# Patient Record
Sex: FEMALE | Race: WHITE | NOT HISPANIC OR LATINO
[De-identification: names, ages, dates, MRNs, and addresses within clinical notes are randomized per-mention and may not be internally consistent; named-entity substitution may affect disease eponyms.]

---

## 2017-07-10 ENCOUNTER — APPOINTMENT (OUTPATIENT)
Dept: CARDIOLOGY | Facility: CLINIC | Age: 72
End: 2017-07-10

## 2017-07-10 ENCOUNTER — NON-APPOINTMENT (OUTPATIENT)
Age: 72
End: 2017-07-10

## 2017-07-10 ENCOUNTER — APPOINTMENT (OUTPATIENT)
Dept: INTERNAL MEDICINE | Facility: CLINIC | Age: 72
End: 2017-07-10

## 2017-07-10 VITALS
HEART RATE: 92 BPM | HEIGHT: 62 IN | OXYGEN SATURATION: 94 % | WEIGHT: 112 LBS | TEMPERATURE: 98.1 F | DIASTOLIC BLOOD PRESSURE: 76 MMHG | RESPIRATION RATE: 12 BRPM | BODY MASS INDEX: 20.61 KG/M2 | SYSTOLIC BLOOD PRESSURE: 151 MMHG

## 2017-07-10 VITALS — DIASTOLIC BLOOD PRESSURE: 60 MMHG | SYSTOLIC BLOOD PRESSURE: 136 MMHG

## 2017-07-10 DIAGNOSIS — F17.200 NICOTINE DEPENDENCE, UNSPECIFIED, UNCOMPLICATED: ICD-10-CM

## 2017-07-13 LAB
25(OH)D3 SERPL-MCNC: 10.2 NG/ML
ALBUMIN SERPL ELPH-MCNC: 3.9 G/DL
ALP BLD-CCNC: 97 U/L
ALT SERPL-CCNC: 26 U/L
ANION GAP SERPL CALC-SCNC: 18 MMOL/L
AST SERPL-CCNC: 34 U/L
BILIRUB SERPL-MCNC: 0.8 MG/DL
BUN SERPL-MCNC: 9 MG/DL
CALCIUM SERPL-MCNC: 9.9 MG/DL
CHLORIDE SERPL-SCNC: 97 MMOL/L
CHOLEST SERPL-MCNC: 219 MG/DL
CHOLEST/HDLC SERPL: 1.7 RATIO
CO2 SERPL-SCNC: 23 MMOL/L
CREAT SERPL-MCNC: 0.97 MG/DL
ETHANOL BLD-MCNC: <10
FOLATE SERPL-MCNC: <2 NG/ML
FRUCTOSAMINE SERPL-MCNC: 227 UMOL/L
GLUCOSE SERPL-MCNC: 98 MG/DL
HBA1C MFR BLD HPLC: 5.3 %
HDLC SERPL-MCNC: 128 MG/DL
IRON SATN MFR SERPL: 13 %
IRON SERPL-MCNC: 23 UG/DL
LDLC SERPL CALC-MCNC: 78 MG/DL
POTASSIUM SERPL-SCNC: 3.5 MMOL/L
PROT SERPL-MCNC: 7.2 G/DL
SODIUM SERPL-SCNC: 138 MMOL/L
TIBC SERPL-MCNC: 175 UG/DL
TRIGL SERPL-MCNC: 66 MG/DL
TSH SERPL-ACNC: 3.63 UIU/ML
UIBC SERPL-MCNC: 152 UG/DL
VIT B12 SERPL-MCNC: 379 PG/ML

## 2017-07-16 ENCOUNTER — LABORATORY RESULT (OUTPATIENT)
Age: 72
End: 2017-07-16

## 2017-07-17 ENCOUNTER — APPOINTMENT (OUTPATIENT)
Dept: CARDIOLOGY | Facility: CLINIC | Age: 72
End: 2017-07-17

## 2017-07-17 ENCOUNTER — LABORATORY RESULT (OUTPATIENT)
Age: 72
End: 2017-07-17

## 2017-07-21 LAB
APPEARANCE: ABNORMAL
BACTERIA UR CULT: ABNORMAL
BILIRUBIN URINE: NEGATIVE
BLOOD URINE: NEGATIVE
COLOR: YELLOW
GLUCOSE QUALITATIVE U: NORMAL MG/DL
KETONES URINE: NEGATIVE
LEUKOCYTE ESTERASE URINE: NEGATIVE
NITRITE URINE: POSITIVE
PH URINE: 6.5
PROTEIN URINE: NEGATIVE MG/DL
SPECIFIC GRAVITY URINE: 1.01
UROBILINOGEN URINE: NORMAL MG/DL

## 2017-07-26 ENCOUNTER — MEDICATION RENEWAL (OUTPATIENT)
Age: 72
End: 2017-07-26

## 2017-07-26 LAB
CREAT SPEC-SCNC: 65 MG/DL
MICROALBUMIN 24H UR DL<=1MG/L-MCNC: 0.4 MG/DL
MICROALBUMIN/CREAT 24H UR-RTO: 6 MG/G

## 2017-07-31 ENCOUNTER — APPOINTMENT (OUTPATIENT)
Dept: CARDIOLOGY | Facility: CLINIC | Age: 72
End: 2017-07-31
Payer: MEDICARE

## 2017-07-31 ENCOUNTER — NON-APPOINTMENT (OUTPATIENT)
Age: 72
End: 2017-07-31

## 2017-07-31 VITALS
OXYGEN SATURATION: 96 % | HEIGHT: 62 IN | HEART RATE: 78 BPM | DIASTOLIC BLOOD PRESSURE: 64 MMHG | BODY MASS INDEX: 20.98 KG/M2 | SYSTOLIC BLOOD PRESSURE: 127 MMHG | WEIGHT: 114 LBS | RESPIRATION RATE: 12 BRPM

## 2017-07-31 VITALS — SYSTOLIC BLOOD PRESSURE: 90 MMHG | DIASTOLIC BLOOD PRESSURE: 60 MMHG

## 2017-07-31 PROCEDURE — 99215 OFFICE O/P EST HI 40 MIN: CPT

## 2017-10-05 ENCOUNTER — NON-APPOINTMENT (OUTPATIENT)
Age: 72
End: 2017-10-05

## 2017-10-05 ENCOUNTER — LABORATORY RESULT (OUTPATIENT)
Age: 72
End: 2017-10-05

## 2017-10-05 ENCOUNTER — APPOINTMENT (OUTPATIENT)
Dept: INTERNAL MEDICINE | Facility: CLINIC | Age: 72
End: 2017-10-05
Payer: MEDICARE

## 2017-10-05 VITALS
SYSTOLIC BLOOD PRESSURE: 179 MMHG | BODY MASS INDEX: 20.61 KG/M2 | WEIGHT: 112 LBS | HEART RATE: 84 BPM | RESPIRATION RATE: 12 BRPM | TEMPERATURE: 98.2 F | OXYGEN SATURATION: 96 % | DIASTOLIC BLOOD PRESSURE: 91 MMHG | HEIGHT: 62 IN

## 2017-10-05 PROCEDURE — 99214 OFFICE O/P EST MOD 30 MIN: CPT

## 2017-10-05 PROCEDURE — 93000 ELECTROCARDIOGRAM COMPLETE: CPT

## 2017-10-09 ENCOUNTER — APPOINTMENT (OUTPATIENT)
Dept: PULMONOLOGY | Facility: CLINIC | Age: 72
End: 2017-10-09
Payer: MEDICARE

## 2017-10-09 ENCOUNTER — NON-APPOINTMENT (OUTPATIENT)
Age: 72
End: 2017-10-09

## 2017-10-09 ENCOUNTER — APPOINTMENT (OUTPATIENT)
Dept: CARDIOLOGY | Facility: CLINIC | Age: 72
End: 2017-10-09
Payer: MEDICARE

## 2017-10-09 VITALS
OXYGEN SATURATION: 98 % | SYSTOLIC BLOOD PRESSURE: 151 MMHG | DIASTOLIC BLOOD PRESSURE: 84 MMHG | HEART RATE: 76 BPM | TEMPERATURE: 98.2 F | RESPIRATION RATE: 12 BRPM | HEIGHT: 62 IN

## 2017-10-09 VITALS — DIASTOLIC BLOOD PRESSURE: 80 MMHG | SYSTOLIC BLOOD PRESSURE: 130 MMHG

## 2017-10-09 DIAGNOSIS — Z01.818 ENCOUNTER FOR OTHER PREPROCEDURAL EXAMINATION: ICD-10-CM

## 2017-10-09 PROCEDURE — 94060 EVALUATION OF WHEEZING: CPT

## 2017-10-09 PROCEDURE — 99214 OFFICE O/P EST MOD 30 MIN: CPT

## 2017-10-09 PROCEDURE — 99204 OFFICE O/P NEW MOD 45 MIN: CPT | Mod: 25

## 2017-10-12 ENCOUNTER — APPOINTMENT (OUTPATIENT)
Dept: INTERNAL MEDICINE | Facility: CLINIC | Age: 72
End: 2017-10-12

## 2017-10-16 LAB
ALBUMIN SERPL ELPH-MCNC: 4.2 G/DL
ALP BLD-CCNC: 64 U/L
ALT SERPL-CCNC: 8 U/L
ANION GAP SERPL CALC-SCNC: 18 MMOL/L
AST SERPL-CCNC: 17 U/L
BILIRUB SERPL-MCNC: 0.5 MG/DL
BUN SERPL-MCNC: 16 MG/DL
CALCIUM SERPL-MCNC: 10.2 MG/DL
CHLORIDE SERPL-SCNC: 99 MMOL/L
CO2 SERPL-SCNC: 23 MMOL/L
CREAT SERPL-MCNC: 0.82 MG/DL
GLUCOSE SERPL-MCNC: 104 MG/DL
INR PPP: 1 RATIO
POCT-PROTHROMBIN TIME: 12.2 SECS
POTASSIUM SERPL-SCNC: 4.5 MMOL/L
PROT SERPL-MCNC: 6.8 G/DL
SODIUM SERPL-SCNC: 140 MMOL/L
TSH SERPL-ACNC: 4.16 UIU/ML

## 2017-10-18 LAB
BASOPHILS # BLD AUTO: 0.1 K/UL
BASOPHILS NFR BLD AUTO: 1.9 %
EOSINOPHIL # BLD AUTO: 0.05 K/UL
EOSINOPHIL NFR BLD AUTO: 0.9 %
HCT VFR BLD CALC: 40.5 %
HGB BLD-MCNC: 13.1 G/DL
LYMPHOCYTES # BLD AUTO: 1.83 K/UL
LYMPHOCYTES NFR BLD AUTO: 36.1 %
MAN DIFF?: NORMAL
MCHC RBC-ENTMCNC: 32.3 GM/DL
MCHC RBC-ENTMCNC: 35.9 PG
MCV RBC AUTO: 111 FL
MONOCYTES # BLD AUTO: 0.23 K/UL
MONOCYTES NFR BLD AUTO: 4.6 %
NEUTROPHILS # BLD AUTO: 2.77 K/UL
NEUTROPHILS NFR BLD AUTO: 54.6 %
PLATELET # BLD AUTO: 192 K/UL
RBC # BLD: 3.65 M/UL
RBC # FLD: 13.9 %
WBC # FLD AUTO: 5.08 K/UL

## 2017-11-12 ENCOUNTER — RX RENEWAL (OUTPATIENT)
Age: 72
End: 2017-11-12

## 2017-12-15 ENCOUNTER — APPOINTMENT (OUTPATIENT)
Dept: INTERNAL MEDICINE | Facility: CLINIC | Age: 72
End: 2017-12-15
Payer: MEDICARE

## 2017-12-15 ENCOUNTER — MEDICATION RENEWAL (OUTPATIENT)
Age: 72
End: 2017-12-15

## 2017-12-15 VITALS
HEART RATE: 98 BPM | WEIGHT: 110 LBS | HEIGHT: 62 IN | TEMPERATURE: 97.5 F | OXYGEN SATURATION: 94 % | SYSTOLIC BLOOD PRESSURE: 200 MMHG | BODY MASS INDEX: 20.24 KG/M2 | DIASTOLIC BLOOD PRESSURE: 83 MMHG | RESPIRATION RATE: 12 BRPM

## 2017-12-15 DIAGNOSIS — F10.10 ALCOHOL ABUSE, UNCOMPLICATED: ICD-10-CM

## 2017-12-15 PROCEDURE — 99214 OFFICE O/P EST MOD 30 MIN: CPT

## 2017-12-22 ENCOUNTER — APPOINTMENT (OUTPATIENT)
Dept: INTERNAL MEDICINE | Facility: CLINIC | Age: 72
End: 2017-12-22
Payer: MEDICARE

## 2017-12-22 VITALS
DIASTOLIC BLOOD PRESSURE: 77 MMHG | WEIGHT: 110 LBS | SYSTOLIC BLOOD PRESSURE: 149 MMHG | HEART RATE: 85 BPM | BODY MASS INDEX: 20.24 KG/M2 | TEMPERATURE: 97.7 F | RESPIRATION RATE: 12 BRPM | OXYGEN SATURATION: 98 % | HEIGHT: 62 IN

## 2017-12-22 PROCEDURE — 99214 OFFICE O/P EST MOD 30 MIN: CPT

## 2018-01-24 ENCOUNTER — APPOINTMENT (OUTPATIENT)
Dept: INTERNAL MEDICINE | Facility: CLINIC | Age: 73
End: 2018-01-24

## 2018-02-19 ENCOUNTER — RX RENEWAL (OUTPATIENT)
Age: 73
End: 2018-02-19

## 2018-03-02 ENCOUNTER — APPOINTMENT (OUTPATIENT)
Dept: INTERNAL MEDICINE | Facility: CLINIC | Age: 73
End: 2018-03-02
Payer: MEDICARE

## 2018-03-02 ENCOUNTER — LABORATORY RESULT (OUTPATIENT)
Age: 73
End: 2018-03-02

## 2018-03-02 VITALS
HEIGHT: 62 IN | SYSTOLIC BLOOD PRESSURE: 145 MMHG | OXYGEN SATURATION: 98 % | HEART RATE: 91 BPM | RESPIRATION RATE: 14 BRPM | WEIGHT: 111 LBS | TEMPERATURE: 97.8 F | DIASTOLIC BLOOD PRESSURE: 78 MMHG | BODY MASS INDEX: 20.43 KG/M2

## 2018-03-02 PROCEDURE — 99214 OFFICE O/P EST MOD 30 MIN: CPT

## 2018-03-05 LAB
ALBUMIN SERPL ELPH-MCNC: 4.1 G/DL
ALP BLD-CCNC: 69 U/L
ALT SERPL-CCNC: 13 U/L
ANION GAP SERPL CALC-SCNC: 19 MMOL/L
AST SERPL-CCNC: 27 U/L
BILIRUB SERPL-MCNC: 0.3 MG/DL
BUN SERPL-MCNC: 18 MG/DL
CALCIUM SERPL-MCNC: 9.9 MG/DL
CHLORIDE SERPL-SCNC: 99 MMOL/L
CHOLEST SERPL-MCNC: 263 MG/DL
CHOLEST/HDLC SERPL: 3.7 RATIO
CO2 SERPL-SCNC: 21 MMOL/L
CREAT SERPL-MCNC: 0.89 MG/DL
GLUCOSE SERPL-MCNC: 96 MG/DL
HDLC SERPL-MCNC: 72 MG/DL
LDLC SERPL CALC-MCNC: 152 MG/DL
POTASSIUM SERPL-SCNC: 5 MMOL/L
PROT SERPL-MCNC: 7.2 G/DL
SODIUM SERPL-SCNC: 139 MMOL/L
TRIGL SERPL-MCNC: 196 MG/DL
TSH SERPL-ACNC: 2.78 UIU/ML

## 2018-03-13 LAB
BASOPHILS # BLD AUTO: 0.21 K/UL
BASOPHILS NFR BLD AUTO: 2.7 %
EOSINOPHIL # BLD AUTO: 0.28 K/UL
EOSINOPHIL NFR BLD AUTO: 3.6
HCT VFR BLD CALC: 40.9 %
HGB BLD-MCNC: 13.3 G/DL
LYMPHOCYTES # BLD AUTO: 1.41 K/UL
LYMPHOCYTES NFR BLD AUTO: 17.9 %
MAN DIFF?: NORMAL
MCHC RBC-ENTMCNC: 32.5 GM/DL
MCHC RBC-ENTMCNC: 34.8 PG
MCV RBC AUTO: 107.1 FL
MONOCYTES # BLD AUTO: 0.56 K/UL
MONOCYTES NFR BLD AUTO: 7.1 %
NEUTROPHILS # BLD AUTO: 5.41 K/UL
NEUTROPHILS NFR BLD AUTO: 68.7 %
PLATELET # BLD AUTO: 254 K/UL
RBC # BLD: 3.82 M/UL
RBC # FLD: 14.3 %
WBC # FLD AUTO: 7.87 K/UL

## 2018-03-29 ENCOUNTER — RX RENEWAL (OUTPATIENT)
Age: 73
End: 2018-03-29

## 2018-04-20 ENCOUNTER — APPOINTMENT (OUTPATIENT)
Dept: INTERNAL MEDICINE | Facility: CLINIC | Age: 73
End: 2018-04-20
Payer: MEDICARE

## 2018-04-20 VITALS
OXYGEN SATURATION: 98 % | TEMPERATURE: 97.5 F | RESPIRATION RATE: 12 BRPM | BODY MASS INDEX: 21.16 KG/M2 | HEIGHT: 62 IN | SYSTOLIC BLOOD PRESSURE: 173 MMHG | WEIGHT: 115 LBS | DIASTOLIC BLOOD PRESSURE: 73 MMHG | HEART RATE: 87 BPM

## 2018-04-20 DIAGNOSIS — I10 ESSENTIAL (PRIMARY) HYPERTENSION: ICD-10-CM

## 2018-04-20 PROCEDURE — 99214 OFFICE O/P EST MOD 30 MIN: CPT

## 2018-04-20 RX ORDER — ATENOLOL 25 MG/1
25 TABLET ORAL
Qty: 30 | Refills: 0 | Status: DISCONTINUED | COMMUNITY
Start: 2018-04-20 | End: 2018-04-20

## 2018-04-21 LAB — 25(OH)D3 SERPL-MCNC: 49.3 NG/ML

## 2018-04-24 LAB
ALBUMIN SERPL ELPH-MCNC: 3.7 G/DL
ALP BLD-CCNC: 66 U/L
ALT SERPL-CCNC: 9 U/L
ANION GAP SERPL CALC-SCNC: 14 MMOL/L
AST SERPL-CCNC: 19 U/L
BILIRUB SERPL-MCNC: 0.4 MG/DL
BUN SERPL-MCNC: 16 MG/DL
CALCIUM SERPL-MCNC: 9.6 MG/DL
CHLORIDE SERPL-SCNC: 101 MMOL/L
CO2 SERPL-SCNC: 24 MMOL/L
CREAT SERPL-MCNC: 0.84 MG/DL
GLUCOSE SERPL-MCNC: 87 MG/DL
POTASSIUM SERPL-SCNC: 4.1 MMOL/L
PROT SERPL-MCNC: 7 G/DL
SODIUM SERPL-SCNC: 139 MMOL/L
TSH SERPL-ACNC: 3.19 UIU/ML
VIT B12 SERPL-MCNC: 288 PG/ML

## 2018-06-06 ENCOUNTER — RX RENEWAL (OUTPATIENT)
Age: 73
End: 2018-06-06

## 2018-07-06 ENCOUNTER — APPOINTMENT (OUTPATIENT)
Dept: INTERNAL MEDICINE | Facility: CLINIC | Age: 73
End: 2018-07-06
Payer: MEDICARE

## 2018-07-06 VITALS
SYSTOLIC BLOOD PRESSURE: 156 MMHG | OXYGEN SATURATION: 98 % | RESPIRATION RATE: 12 BRPM | DIASTOLIC BLOOD PRESSURE: 76 MMHG | HEART RATE: 102 BPM | HEIGHT: 62 IN | TEMPERATURE: 97.9 F | WEIGHT: 110 LBS | BODY MASS INDEX: 20.24 KG/M2

## 2018-07-06 DIAGNOSIS — E53.8 DEFICIENCY OF OTHER SPECIFIED B GROUP VITAMINS: ICD-10-CM

## 2018-07-06 DIAGNOSIS — E55.9 VITAMIN D DEFICIENCY, UNSPECIFIED: ICD-10-CM

## 2018-07-06 PROCEDURE — 96372 THER/PROPH/DIAG INJ SC/IM: CPT

## 2018-07-06 PROCEDURE — 99214 OFFICE O/P EST MOD 30 MIN: CPT | Mod: 25

## 2018-07-06 RX ORDER — CYANOCOBALAMIN 1000 UG/ML
1000 INJECTION INTRAMUSCULAR; SUBCUTANEOUS
Qty: 0 | Refills: 0 | Status: COMPLETED | OUTPATIENT
Start: 2018-07-06

## 2018-07-06 RX ADMIN — CYANOCOBALAMIN 0 MCG/ML: 1000 INJECTION INTRAMUSCULAR; SUBCUTANEOUS at 00:00

## 2018-07-24 PROBLEM — E53.8 LOW VITAMIN B12 LEVEL: Status: ACTIVE | Noted: 2018-07-06

## 2018-07-24 PROBLEM — E55.9 VITAMIN D DEFICIENCY: Status: ACTIVE | Noted: 2017-07-13

## 2018-07-24 NOTE — HISTORY OF PRESENT ILLNESS
[de-identified] : 73 year old female with h/o COPD, active tobacco use 1.5 PPD/ ETOH abuse/ / Hypertension/ alcoholic liver disease/ hypercholesterolemia presents for follow up on chronic problems .   She denies CP/SOB, dizziness, N, V, abdominal pain. As per  drinks 3-4 glasses of wine daily  , smoke 2 PPD . Pt has high BP today , say forgot to take Amlodipine 10 mgh today

## 2018-07-24 NOTE — PHYSICAL EXAM
[No Acute Distress] : no acute distress [Well Nourished] : well nourished [Well Developed] : well developed [Normal Sclera/Conjunctiva] : normal sclera/conjunctiva [EOMI] : extraocular movements intact [Normal Outer Ear/Nose] : the outer ears and nose were normal in appearance [Normal Oropharynx] : the oropharynx was normal [No JVD] : no jugular venous distention [Supple] : supple [No Lymphadenopathy] : no lymphadenopathy [Thyroid Normal, No Nodules] : the thyroid was normal and there were no nodules present [No Respiratory Distress] : no respiratory distress  [Clear to Auscultation] : lungs were clear to auscultation bilaterally [No Accessory Muscle Use] : no accessory muscle use [Normal Rate] : normal rate  [Regular Rhythm] : with a regular rhythm [Normal S1, S2] : normal S1 and S2 [No Murmur] : no murmur heard [No Carotid Bruits] : no carotid bruits [No Abdominal Bruit] : a ~M bruit was not heard ~T in the abdomen [No Varicosities] : no varicosities [Pedal Pulses Present] : the pedal pulses are present [No Edema] : there was no peripheral edema [No Extremity Clubbing/Cyanosis] : no extremity clubbing/cyanosis [No Palpable Aorta] : no palpable aorta [Soft] : abdomen soft [Non Tender] : non-tender [Non-distended] : non-distended [No Masses] : no abdominal mass palpated [No HSM] : no HSM [Normal Bowel Sounds] : normal bowel sounds [Normal Posterior Cervical Nodes] : no posterior cervical lymphadenopathy [Normal Anterior Cervical Nodes] : no anterior cervical lymphadenopathy [No CVA Tenderness] : no CVA  tenderness [No Spinal Tenderness] : no spinal tenderness [No Joint Swelling] : no joint swelling [Grossly Normal Strength/Tone] : grossly normal strength/tone [No Rash] : no rash [Normal Gait] : normal gait [Coordination Grossly Intact] : coordination grossly intact [No Focal Deficits] : no focal deficits [Deep Tendon Reflexes (DTR)] : deep tendon reflexes were 2+ and symmetric [Alert and Oriented x3] : oriented to person, place, and time [de-identified] : flat affect

## 2018-07-24 NOTE — ASSESSMENT
[FreeTextEntry1] : 1. Hypertension, poor control \par low Na diet\par  Amlodipine to 10 mg QD\par alcohol / tobacco cessation d/w pt \par cardio  follow up \par 2.Hyperlipidemia\par low fat/ low cholesterol diet \par 3. COPD\par Ventolin prn \par 4. see plan

## 2018-07-24 NOTE — REVIEW OF SYSTEMS
GOMEZ DC went over dc instructions with pt and family at this time. Pt has no questions or concerns. Pt ambulated to car for dc home.
[Negative] : Heme/Lymph

## 2018-08-23 ENCOUNTER — APPOINTMENT (OUTPATIENT)
Dept: INTERNAL MEDICINE | Facility: CLINIC | Age: 73
End: 2018-08-23
Payer: MEDICARE

## 2018-08-23 VITALS
WEIGHT: 113 LBS | DIASTOLIC BLOOD PRESSURE: 75 MMHG | OXYGEN SATURATION: 97 % | HEIGHT: 62 IN | TEMPERATURE: 98.5 F | BODY MASS INDEX: 20.8 KG/M2 | SYSTOLIC BLOOD PRESSURE: 159 MMHG | HEART RATE: 88 BPM | RESPIRATION RATE: 12 BRPM

## 2018-08-23 PROCEDURE — 99214 OFFICE O/P EST MOD 30 MIN: CPT

## 2018-08-23 NOTE — PHYSICAL EXAM
[No Acute Distress] : no acute distress [Well Nourished] : well nourished [Well Developed] : well developed [Normal Sclera/Conjunctiva] : normal sclera/conjunctiva [EOMI] : extraocular movements intact [Normal Outer Ear/Nose] : the outer ears and nose were normal in appearance [Normal Oropharynx] : the oropharynx was normal [No JVD] : no jugular venous distention [Supple] : supple [No Lymphadenopathy] : no lymphadenopathy [Thyroid Normal, No Nodules] : the thyroid was normal and there were no nodules present [No Respiratory Distress] : no respiratory distress  [Clear to Auscultation] : lungs were clear to auscultation bilaterally [No Accessory Muscle Use] : no accessory muscle use [Normal Rate] : normal rate  [Regular Rhythm] : with a regular rhythm [Normal S1, S2] : normal S1 and S2 [No Murmur] : no murmur heard [No Carotid Bruits] : no carotid bruits [No Abdominal Bruit] : a ~M bruit was not heard ~T in the abdomen [No Varicosities] : no varicosities [Pedal Pulses Present] : the pedal pulses are present [No Edema] : there was no peripheral edema [No Extremity Clubbing/Cyanosis] : no extremity clubbing/cyanosis [No Palpable Aorta] : no palpable aorta [Soft] : abdomen soft [Non Tender] : non-tender [Non-distended] : non-distended [No Masses] : no abdominal mass palpated [No HSM] : no HSM [Normal Bowel Sounds] : normal bowel sounds [Normal Posterior Cervical Nodes] : no posterior cervical lymphadenopathy [Normal Anterior Cervical Nodes] : no anterior cervical lymphadenopathy [No CVA Tenderness] : no CVA  tenderness [No Spinal Tenderness] : no spinal tenderness [No Joint Swelling] : no joint swelling [Grossly Normal Strength/Tone] : grossly normal strength/tone [No Rash] : no rash [Normal Gait] : normal gait [Coordination Grossly Intact] : coordination grossly intact [No Focal Deficits] : no focal deficits [Deep Tendon Reflexes (DTR)] : deep tendon reflexes were 2+ and symmetric [Alert and Oriented x3] : oriented to person, place, and time [de-identified] : flat affect

## 2018-08-23 NOTE — HISTORY OF PRESENT ILLNESS
[de-identified] : 73 year old female with h/o COPD, active tobacco use 1.5 PPD/ ETOH abuse/ / Hypertension/ alcoholic liver disease/ hypercholesterolemia presents for follow up on chronic problems .   She denies CP/SOB, dizziness, N, V, abdominal pain. She  drinks 3-4 glasses of wine daily  , smoke 2 PPD . Pt has high BP today , reports taking  Amlodipine 10 mgh daily

## 2018-08-23 NOTE — ASSESSMENT
[FreeTextEntry1] : 1. Hypertension, poor control \par low Na diet\par  Amlodipine to 10 mg QDadd Atenolol 20 mg QD\par alcohol / tobacco cessation d/w pt \par cardio  follow up \par 2.Hyperlipidemia\par low fat/ low cholesterol diet \par 3. COPD\par Ventolin prn \par

## 2018-08-24 LAB
25(OH)D3 SERPL-MCNC: 57.4 NG/ML
ALBUMIN SERPL ELPH-MCNC: 4 G/DL
ALP BLD-CCNC: 74 U/L
ALT SERPL-CCNC: 6 U/L
ANION GAP SERPL CALC-SCNC: 15 MMOL/L
AST SERPL-CCNC: 19 U/L
BASOPHILS # BLD AUTO: 0.05 K/UL
BASOPHILS NFR BLD AUTO: 0.7 %
BILIRUB SERPL-MCNC: 0.3 MG/DL
BUN SERPL-MCNC: 14 MG/DL
CALCIUM SERPL-MCNC: 9.8 MG/DL
CHLORIDE SERPL-SCNC: 97 MMOL/L
CHOLEST SERPL-MCNC: 228 MG/DL
CHOLEST/HDLC SERPL: 3.7 RATIO
CO2 SERPL-SCNC: 25 MMOL/L
CREAT SERPL-MCNC: 0.83 MG/DL
EOSINOPHIL # BLD AUTO: 0.3 K/UL
EOSINOPHIL NFR BLD AUTO: 4.4 %
GLUCOSE SERPL-MCNC: 78 MG/DL
HCT VFR BLD CALC: 42.8 %
HDLC SERPL-MCNC: 62 MG/DL
HGB BLD-MCNC: 13.8 G/DL
IMM GRANULOCYTES NFR BLD AUTO: 0.3 %
LDLC SERPL CALC-MCNC: 125 MG/DL
LYMPHOCYTES # BLD AUTO: 1.69 K/UL
LYMPHOCYTES NFR BLD AUTO: 25 %
MAN DIFF?: NORMAL
MCHC RBC-ENTMCNC: 32.2 GM/DL
MCHC RBC-ENTMCNC: 34.9 PG
MCV RBC AUTO: 108.4 FL
MONOCYTES # BLD AUTO: 0.45 K/UL
MONOCYTES NFR BLD AUTO: 6.7 %
NEUTROPHILS # BLD AUTO: 4.25 K/UL
NEUTROPHILS NFR BLD AUTO: 62.9 %
PLATELET # BLD AUTO: 233 K/UL
POTASSIUM SERPL-SCNC: 4.2 MMOL/L
PROT SERPL-MCNC: 7.2 G/DL
RBC # BLD: 3.95 M/UL
RBC # FLD: 14.2 %
SODIUM SERPL-SCNC: 137 MMOL/L
TRIGL SERPL-MCNC: 203 MG/DL
TSH SERPL-ACNC: 3.4 UIU/ML
WBC # FLD AUTO: 6.76 K/UL

## 2018-10-18 DIAGNOSIS — Z12.11 ENCOUNTER FOR SCREENING FOR MALIGNANT NEOPLASM OF COLON: ICD-10-CM

## 2019-01-01 ENCOUNTER — APPOINTMENT (OUTPATIENT)
Dept: CT IMAGING | Facility: CLINIC | Age: 74
End: 2019-01-01

## 2019-01-01 ENCOUNTER — RX RENEWAL (OUTPATIENT)
Age: 74
End: 2019-01-01

## 2019-01-01 ENCOUNTER — APPOINTMENT (OUTPATIENT)
Dept: NEUROLOGY | Facility: CLINIC | Age: 74
End: 2019-01-01
Payer: MEDICARE

## 2019-01-01 ENCOUNTER — MESSAGE (OUTPATIENT)
Age: 74
End: 2019-01-01

## 2019-01-01 ENCOUNTER — APPOINTMENT (OUTPATIENT)
Dept: HEMATOLOGY ONCOLOGY | Facility: CLINIC | Age: 74
End: 2019-01-01
Payer: MEDICARE

## 2019-01-01 ENCOUNTER — OUTPATIENT (OUTPATIENT)
Dept: OUTPATIENT SERVICES | Facility: HOSPITAL | Age: 74
LOS: 1 days | End: 2019-01-01
Payer: MEDICARE

## 2019-01-01 ENCOUNTER — OUTPATIENT (OUTPATIENT)
Dept: OUTPATIENT SERVICES | Facility: HOSPITAL | Age: 74
LOS: 1 days | Discharge: HOME | End: 2019-01-01

## 2019-01-01 ENCOUNTER — RESULT REVIEW (OUTPATIENT)
Age: 74
End: 2019-01-01

## 2019-01-01 ENCOUNTER — LABORATORY RESULT (OUTPATIENT)
Age: 74
End: 2019-01-01

## 2019-01-01 ENCOUNTER — APPOINTMENT (OUTPATIENT)
Dept: NEUROLOGY | Facility: CLINIC | Age: 74
End: 2019-01-01

## 2019-01-01 ENCOUNTER — APPOINTMENT (OUTPATIENT)
Dept: CT IMAGING | Facility: IMAGING CENTER | Age: 74
End: 2019-01-01
Payer: MEDICARE

## 2019-01-01 ENCOUNTER — APPOINTMENT (OUTPATIENT)
Dept: CT IMAGING | Facility: HOSPITAL | Age: 74
End: 2019-01-01

## 2019-01-01 ENCOUNTER — APPOINTMENT (OUTPATIENT)
Dept: HEMATOLOGY ONCOLOGY | Facility: CLINIC | Age: 74
End: 2019-01-01

## 2019-01-01 ENCOUNTER — APPOINTMENT (OUTPATIENT)
Dept: INFUSION THERAPY | Facility: HOSPITAL | Age: 74
End: 2019-01-01

## 2019-01-01 ENCOUNTER — FORM ENCOUNTER (OUTPATIENT)
Age: 74
End: 2019-01-01

## 2019-01-01 ENCOUNTER — APPOINTMENT (OUTPATIENT)
Dept: PULMONOLOGY | Facility: CLINIC | Age: 74
End: 2019-01-01
Payer: MEDICARE

## 2019-01-01 ENCOUNTER — APPOINTMENT (OUTPATIENT)
Dept: MRI IMAGING | Facility: CLINIC | Age: 74
End: 2019-01-01

## 2019-01-01 ENCOUNTER — APPOINTMENT (OUTPATIENT)
Dept: MRI IMAGING | Facility: CLINIC | Age: 74
End: 2019-01-01
Payer: MEDICARE

## 2019-01-01 ENCOUNTER — APPOINTMENT (OUTPATIENT)
Dept: PULMONOLOGY | Facility: CLINIC | Age: 74
End: 2019-01-01

## 2019-01-01 ENCOUNTER — APPOINTMENT (OUTPATIENT)
Dept: ULTRASOUND IMAGING | Facility: IMAGING CENTER | Age: 74
End: 2019-01-01

## 2019-01-01 ENCOUNTER — APPOINTMENT (OUTPATIENT)
Dept: CT IMAGING | Facility: CLINIC | Age: 74
End: 2019-01-01
Payer: MEDICARE

## 2019-01-01 ENCOUNTER — TRANSCRIPTION ENCOUNTER (OUTPATIENT)
Age: 74
End: 2019-01-01

## 2019-01-01 ENCOUNTER — APPOINTMENT (OUTPATIENT)
Dept: RADIOLOGY | Facility: CLINIC | Age: 74
End: 2019-01-01
Payer: MEDICARE

## 2019-01-01 ENCOUNTER — APPOINTMENT (OUTPATIENT)
Dept: MRI IMAGING | Facility: IMAGING CENTER | Age: 74
End: 2019-01-01
Payer: MEDICARE

## 2019-01-01 VITALS
HEART RATE: 98 BPM | OXYGEN SATURATION: 97 % | WEIGHT: 110 LBS | RESPIRATION RATE: 16 BRPM | DIASTOLIC BLOOD PRESSURE: 61 MMHG | SYSTOLIC BLOOD PRESSURE: 103 MMHG | BODY MASS INDEX: 20.12 KG/M2 | TEMPERATURE: 99.7 F

## 2019-01-01 VITALS
OXYGEN SATURATION: 97 % | WEIGHT: 111.99 LBS | TEMPERATURE: 98.4 F | HEART RATE: 77 BPM | DIASTOLIC BLOOD PRESSURE: 60 MMHG | SYSTOLIC BLOOD PRESSURE: 101 MMHG | BODY MASS INDEX: 20.48 KG/M2 | RESPIRATION RATE: 15 BRPM

## 2019-01-01 VITALS
DIASTOLIC BLOOD PRESSURE: 76 MMHG | BODY MASS INDEX: 21.01 KG/M2 | TEMPERATURE: 97.9 F | RESPIRATION RATE: 16 BRPM | SYSTOLIC BLOOD PRESSURE: 141 MMHG | HEART RATE: 71 BPM | OXYGEN SATURATION: 96 % | WEIGHT: 114.86 LBS

## 2019-01-01 VITALS
BODY MASS INDEX: 21.33 KG/M2 | OXYGEN SATURATION: 95 % | HEART RATE: 76 BPM | RESPIRATION RATE: 20 BRPM | TEMPERATURE: 97.8 F | WEIGHT: 112.85 LBS | SYSTOLIC BLOOD PRESSURE: 108 MMHG | DIASTOLIC BLOOD PRESSURE: 61 MMHG

## 2019-01-01 VITALS
TEMPERATURE: 99.1 F | WEIGHT: 117.95 LBS | HEIGHT: 62.01 IN | OXYGEN SATURATION: 94 % | BODY MASS INDEX: 21.43 KG/M2 | DIASTOLIC BLOOD PRESSURE: 69 MMHG | HEART RATE: 82 BPM | RESPIRATION RATE: 14 BRPM | SYSTOLIC BLOOD PRESSURE: 113 MMHG

## 2019-01-01 VITALS
HEIGHT: 62 IN | HEART RATE: 94 BPM | BODY MASS INDEX: 20.43 KG/M2 | DIASTOLIC BLOOD PRESSURE: 64 MMHG | RESPIRATION RATE: 16 BRPM | SYSTOLIC BLOOD PRESSURE: 119 MMHG | WEIGHT: 111 LBS | TEMPERATURE: 98.6 F

## 2019-01-01 VITALS
BODY MASS INDEX: 21.12 KG/M2 | HEART RATE: 74 BPM | SYSTOLIC BLOOD PRESSURE: 101 MMHG | DIASTOLIC BLOOD PRESSURE: 65 MMHG | TEMPERATURE: 98.1 F | RESPIRATION RATE: 14 BRPM | WEIGHT: 115.52 LBS | OXYGEN SATURATION: 91 %

## 2019-01-01 VITALS
SYSTOLIC BLOOD PRESSURE: 114 MMHG | BODY MASS INDEX: 21.52 KG/M2 | WEIGHT: 114 LBS | DIASTOLIC BLOOD PRESSURE: 64 MMHG | HEIGHT: 61 IN | HEART RATE: 80 BPM

## 2019-01-01 VITALS
BODY MASS INDEX: 20.56 KG/M2 | HEART RATE: 71 BPM | DIASTOLIC BLOOD PRESSURE: 61 MMHG | TEMPERATURE: 99.4 F | RESPIRATION RATE: 16 BRPM | WEIGHT: 111.75 LBS | SYSTOLIC BLOOD PRESSURE: 109 MMHG | HEIGHT: 62 IN | OXYGEN SATURATION: 95 %

## 2019-01-01 VITALS
OXYGEN SATURATION: 95 % | SYSTOLIC BLOOD PRESSURE: 104 MMHG | BODY MASS INDEX: 20.4 KG/M2 | TEMPERATURE: 98.2 F | WEIGHT: 111.55 LBS | HEART RATE: 80 BPM | DIASTOLIC BLOOD PRESSURE: 66 MMHG | RESPIRATION RATE: 16 BRPM

## 2019-01-01 VITALS
RESPIRATION RATE: 15 BRPM | SYSTOLIC BLOOD PRESSURE: 114 MMHG | DIASTOLIC BLOOD PRESSURE: 64 MMHG | BODY MASS INDEX: 20.76 KG/M2 | HEART RATE: 84 BPM | WEIGHT: 113.54 LBS | OXYGEN SATURATION: 92 % | TEMPERATURE: 98 F

## 2019-01-01 DIAGNOSIS — C34.90 MALIGNANT NEOPLASM OF UNSPECIFIED PART OF UNSPECIFIED BRONCHUS OR LUNG: ICD-10-CM

## 2019-01-01 DIAGNOSIS — H91.93 UNSPECIFIED HEARING LOSS, BILATERAL: ICD-10-CM

## 2019-01-01 DIAGNOSIS — R68.89 OTHER GENERAL SYMPTOMS AND SIGNS: ICD-10-CM

## 2019-01-01 DIAGNOSIS — F41.9 ANXIETY DISORDER, UNSPECIFIED: ICD-10-CM

## 2019-01-01 DIAGNOSIS — E53.8 DEFICIENCY OF OTHER SPECIFIED B GROUP VITAMINS: ICD-10-CM

## 2019-01-01 DIAGNOSIS — R91.8 OTHER NONSPECIFIC ABNORMAL FINDING OF LUNG FIELD: ICD-10-CM

## 2019-01-01 DIAGNOSIS — Z51.11 ENCOUNTER FOR ANTINEOPLASTIC CHEMOTHERAPY: ICD-10-CM

## 2019-01-01 DIAGNOSIS — Z00.8 ENCOUNTER FOR OTHER GENERAL EXAMINATION: ICD-10-CM

## 2019-01-01 DIAGNOSIS — Z71.89 OTHER SPECIFIED COUNSELING: ICD-10-CM

## 2019-01-01 DIAGNOSIS — R26.89 OTHER ABNORMALITIES OF GAIT AND MOBILITY: ICD-10-CM

## 2019-01-01 DIAGNOSIS — J44.9 CHRONIC OBSTRUCTIVE PULMONARY DISEASE, UNSPECIFIED: ICD-10-CM

## 2019-01-01 DIAGNOSIS — R53.83 OTHER FATIGUE: ICD-10-CM

## 2019-01-01 DIAGNOSIS — R05 COUGH: ICD-10-CM

## 2019-01-01 DIAGNOSIS — R53.1 WEAKNESS: ICD-10-CM

## 2019-01-01 DIAGNOSIS — R60.0 LOCALIZED EDEMA: ICD-10-CM

## 2019-01-01 DIAGNOSIS — F02.80 DEMENTIA IN OTHER DISEASES CLASSIFIED ELSEWHERE W/OUT BEHAVIORAL DISTURBANCE: ICD-10-CM

## 2019-01-01 DIAGNOSIS — R11.2 NAUSEA WITH VOMITING, UNSPECIFIED: ICD-10-CM

## 2019-01-01 DIAGNOSIS — R63.0 ANOREXIA: ICD-10-CM

## 2019-01-01 LAB
ALBUMIN SERPL ELPH-MCNC: 3.2 G/DL
ALBUMIN SERPL ELPH-MCNC: 3.2 G/DL
ALBUMIN SERPL ELPH-MCNC: 3.4 G/DL
ALBUMIN SERPL ELPH-MCNC: 3.4 G/DL
ALBUMIN SERPL ELPH-MCNC: 3.5 G/DL
ALBUMIN SERPL ELPH-MCNC: 3.6 G/DL
ALP BLD-CCNC: 101 U/L
ALP BLD-CCNC: 105 U/L
ALP BLD-CCNC: 106 U/L
ALP BLD-CCNC: 85 U/L
ALP BLD-CCNC: 93 U/L
ALP BLD-CCNC: 95 U/L
ALT SERPL-CCNC: 10 U/L
ALT SERPL-CCNC: 10 U/L
ALT SERPL-CCNC: 13 U/L
ALT SERPL-CCNC: 13 U/L
ALT SERPL-CCNC: 21 U/L
ALT SERPL-CCNC: 6 U/L
ANION GAP SERPL CALC-SCNC: 12 MMOL/L
ANION GAP SERPL CALC-SCNC: 12 MMOL/L
ANION GAP SERPL CALC-SCNC: 13 MMOL/L
ANION GAP SERPL CALC-SCNC: 14 MMOL/L
ANION GAP SERPL CALC-SCNC: 16 MMOL/L
ANION GAP SERPL CALC-SCNC: 17 MMOL/L
AST SERPL-CCNC: 15 U/L
AST SERPL-CCNC: 15 U/L
AST SERPL-CCNC: 19 U/L
AST SERPL-CCNC: 24 U/L
AST SERPL-CCNC: 28 U/L
AST SERPL-CCNC: 33 U/L
BASOPHILS # BLD AUTO: 0 K/UL — SIGNIFICANT CHANGE UP (ref 0–0.2)
BASOPHILS # BLD AUTO: 0.1 K/UL — SIGNIFICANT CHANGE UP (ref 0–0.2)
BASOPHILS NFR BLD AUTO: 0.4 % — SIGNIFICANT CHANGE UP (ref 0–2)
BASOPHILS NFR BLD AUTO: 0.4 % — SIGNIFICANT CHANGE UP (ref 0–2)
BASOPHILS NFR BLD AUTO: 0.5 % — SIGNIFICANT CHANGE UP (ref 0–2)
BASOPHILS NFR BLD AUTO: 0.5 % — SIGNIFICANT CHANGE UP (ref 0–2)
BASOPHILS NFR BLD AUTO: 0.6 % — SIGNIFICANT CHANGE UP (ref 0–2)
BASOPHILS NFR BLD AUTO: 0.6 % — SIGNIFICANT CHANGE UP (ref 0–2)
BASOPHILS NFR BLD AUTO: 0.7 % — SIGNIFICANT CHANGE UP (ref 0–2)
BASOPHILS NFR BLD AUTO: 0.8 % — SIGNIFICANT CHANGE UP (ref 0–2)
BASOPHILS NFR BLD AUTO: 1.3 % — SIGNIFICANT CHANGE UP (ref 0–2)
BILIRUB SERPL-MCNC: 0.3 MG/DL
BILIRUB SERPL-MCNC: 0.3 MG/DL
BILIRUB SERPL-MCNC: 0.4 MG/DL
BILIRUB SERPL-MCNC: 0.6 MG/DL
BUN SERPL-MCNC: 10 MG/DL
BUN SERPL-MCNC: 10 MG/DL
BUN SERPL-MCNC: 11 MG/DL
BUN SERPL-MCNC: 6 MG/DL
BUN SERPL-MCNC: 6 MG/DL
BUN SERPL-MCNC: 8 MG/DL
CALCIUM SERPL-MCNC: 8.8 MG/DL
CALCIUM SERPL-MCNC: 8.9 MG/DL
CALCIUM SERPL-MCNC: 8.9 MG/DL
CALCIUM SERPL-MCNC: 9 MG/DL
CALCIUM SERPL-MCNC: 9.1 MG/DL
CALCIUM SERPL-MCNC: 9.4 MG/DL
CHLORIDE SERPL-SCNC: 100 MMOL/L
CHLORIDE SERPL-SCNC: 96 MMOL/L
CHLORIDE SERPL-SCNC: 97 MMOL/L
CHLORIDE SERPL-SCNC: 98 MMOL/L
CO2 SERPL-SCNC: 21 MMOL/L
CO2 SERPL-SCNC: 21 MMOL/L
CO2 SERPL-SCNC: 25 MMOL/L
CO2 SERPL-SCNC: 26 MMOL/L
CREAT SERPL-MCNC: 0.55 MG/DL
CREAT SERPL-MCNC: 0.59 MG/DL
CREAT SERPL-MCNC: 0.62 MG/DL
CREAT SERPL-MCNC: 0.64 MG/DL
CREAT SERPL-MCNC: 0.66 MG/DL
CREAT SERPL-MCNC: 0.73 MG/DL
EOSINOPHIL # BLD AUTO: 0.1 K/UL — SIGNIFICANT CHANGE UP (ref 0–0.5)
EOSINOPHIL # BLD AUTO: 0.2 K/UL — SIGNIFICANT CHANGE UP (ref 0–0.5)
EOSINOPHIL # BLD AUTO: 0.3 K/UL — SIGNIFICANT CHANGE UP (ref 0–0.5)
EOSINOPHIL # BLD AUTO: 0.3 K/UL — SIGNIFICANT CHANGE UP (ref 0–0.5)
EOSINOPHIL # BLD AUTO: 0.4 K/UL — SIGNIFICANT CHANGE UP (ref 0–0.5)
EOSINOPHIL # BLD AUTO: 0.4 K/UL — SIGNIFICANT CHANGE UP (ref 0–0.5)
EOSINOPHIL # BLD AUTO: 1.1 K/UL — HIGH (ref 0–0.5)
EOSINOPHIL NFR BLD AUTO: 0.4 % — SIGNIFICANT CHANGE UP (ref 0–6)
EOSINOPHIL NFR BLD AUTO: 0.7 % — SIGNIFICANT CHANGE UP (ref 0–6)
EOSINOPHIL NFR BLD AUTO: 1.1 % — SIGNIFICANT CHANGE UP (ref 0–6)
EOSINOPHIL NFR BLD AUTO: 1.2 % — SIGNIFICANT CHANGE UP (ref 0–6)
EOSINOPHIL NFR BLD AUTO: 14.5 % — HIGH (ref 0–6)
EOSINOPHIL NFR BLD AUTO: 3.1 % — SIGNIFICANT CHANGE UP (ref 0–6)
EOSINOPHIL NFR BLD AUTO: 3.1 % — SIGNIFICANT CHANGE UP (ref 0–6)
EOSINOPHIL NFR BLD AUTO: 4.2 % — SIGNIFICANT CHANGE UP (ref 0–6)
EOSINOPHIL NFR BLD AUTO: 4.8 % — SIGNIFICANT CHANGE UP (ref 0–6)
FOLATE SERPL-MCNC: 2.4 NG/ML
FOLATE SERPL-MCNC: >20 NG/ML
GLUCOSE SERPL-MCNC: 105 MG/DL
GLUCOSE SERPL-MCNC: 106 MG/DL
GLUCOSE SERPL-MCNC: 109 MG/DL
GLUCOSE SERPL-MCNC: 122 MG/DL
GLUCOSE SERPL-MCNC: 137 MG/DL
GLUCOSE SERPL-MCNC: 138 MG/DL
HCT VFR BLD CALC: 35.8 % — SIGNIFICANT CHANGE UP (ref 34.5–45)
HCT VFR BLD CALC: 36.1 % — SIGNIFICANT CHANGE UP (ref 34.5–45)
HCT VFR BLD CALC: 36.5 % — SIGNIFICANT CHANGE UP (ref 34.5–45)
HCT VFR BLD CALC: 37.2 % — SIGNIFICANT CHANGE UP (ref 34.5–45)
HCT VFR BLD CALC: 37.7 % — SIGNIFICANT CHANGE UP (ref 34.5–45)
HCT VFR BLD CALC: 37.7 % — SIGNIFICANT CHANGE UP (ref 34.5–45)
HCT VFR BLD CALC: 37.9 % — SIGNIFICANT CHANGE UP (ref 34.5–45)
HCT VFR BLD CALC: 37.9 % — SIGNIFICANT CHANGE UP (ref 34.5–45)
HCT VFR BLD CALC: 39.6 % — SIGNIFICANT CHANGE UP (ref 34.5–45)
HGB BLD-MCNC: 11.2 G/DL — LOW (ref 11.5–15.5)
HGB BLD-MCNC: 11.5 G/DL — SIGNIFICANT CHANGE UP (ref 11.5–15.5)
HGB BLD-MCNC: 11.9 G/DL — SIGNIFICANT CHANGE UP (ref 11.5–15.5)
HGB BLD-MCNC: 12 G/DL — SIGNIFICANT CHANGE UP (ref 11.5–15.5)
HGB BLD-MCNC: 12.3 G/DL — SIGNIFICANT CHANGE UP (ref 11.5–15.5)
HGB BLD-MCNC: 12.5 G/DL — SIGNIFICANT CHANGE UP (ref 11.5–15.5)
HGB BLD-MCNC: 12.5 G/DL — SIGNIFICANT CHANGE UP (ref 11.5–15.5)
HGB BLD-MCNC: 12.7 G/DL — SIGNIFICANT CHANGE UP (ref 11.5–15.5)
HGB BLD-MCNC: 13 G/DL — SIGNIFICANT CHANGE UP (ref 11.5–15.5)
LDH SERPL-CCNC: 179 U/L
LDH SERPL-CCNC: 181 U/L
LYMPHOCYTES # BLD AUTO: 1.1 K/UL — SIGNIFICANT CHANGE UP (ref 1–3.3)
LYMPHOCYTES # BLD AUTO: 1.1 K/UL — SIGNIFICANT CHANGE UP (ref 1–3.3)
LYMPHOCYTES # BLD AUTO: 1.3 K/UL — SIGNIFICANT CHANGE UP (ref 1–3.3)
LYMPHOCYTES # BLD AUTO: 1.4 K/UL — SIGNIFICANT CHANGE UP (ref 1–3.3)
LYMPHOCYTES # BLD AUTO: 1.6 K/UL — SIGNIFICANT CHANGE UP (ref 1–3.3)
LYMPHOCYTES # BLD AUTO: 1.6 K/UL — SIGNIFICANT CHANGE UP (ref 1–3.3)
LYMPHOCYTES # BLD AUTO: 1.8 K/UL — SIGNIFICANT CHANGE UP (ref 1–3.3)
LYMPHOCYTES # BLD AUTO: 10.5 % — LOW (ref 13–44)
LYMPHOCYTES # BLD AUTO: 11.1 % — LOW (ref 13–44)
LYMPHOCYTES # BLD AUTO: 12.3 % — LOW (ref 13–44)
LYMPHOCYTES # BLD AUTO: 13 % — SIGNIFICANT CHANGE UP (ref 13–44)
LYMPHOCYTES # BLD AUTO: 13.1 % — SIGNIFICANT CHANGE UP (ref 13–44)
LYMPHOCYTES # BLD AUTO: 13.2 % — SIGNIFICANT CHANGE UP (ref 13–44)
LYMPHOCYTES # BLD AUTO: 14.6 % — SIGNIFICANT CHANGE UP (ref 13–44)
LYMPHOCYTES # BLD AUTO: 20.1 % — SIGNIFICANT CHANGE UP (ref 13–44)
LYMPHOCYTES # BLD AUTO: 8.7 % — LOW (ref 13–44)
MCHC RBC-ENTMCNC: 31.1 G/DL — LOW (ref 32–36)
MCHC RBC-ENTMCNC: 31.2 PG — SIGNIFICANT CHANGE UP (ref 27–34)
MCHC RBC-ENTMCNC: 32 G/DL — SIGNIFICANT CHANGE UP (ref 32–36)
MCHC RBC-ENTMCNC: 32 G/DL — SIGNIFICANT CHANGE UP (ref 32–36)
MCHC RBC-ENTMCNC: 32.2 PG — SIGNIFICANT CHANGE UP (ref 27–34)
MCHC RBC-ENTMCNC: 32.4 G/DL — SIGNIFICANT CHANGE UP (ref 32–36)
MCHC RBC-ENTMCNC: 32.4 PG — SIGNIFICANT CHANGE UP (ref 27–34)
MCHC RBC-ENTMCNC: 32.5 PG — SIGNIFICANT CHANGE UP (ref 27–34)
MCHC RBC-ENTMCNC: 32.6 G/DL — SIGNIFICANT CHANGE UP (ref 32–36)
MCHC RBC-ENTMCNC: 32.7 G/DL — SIGNIFICANT CHANGE UP (ref 32–36)
MCHC RBC-ENTMCNC: 33 G/DL — SIGNIFICANT CHANGE UP (ref 32–36)
MCHC RBC-ENTMCNC: 33 PG — SIGNIFICANT CHANGE UP (ref 27–34)
MCHC RBC-ENTMCNC: 33.2 G/DL — SIGNIFICANT CHANGE UP (ref 32–36)
MCHC RBC-ENTMCNC: 33.2 PG — SIGNIFICANT CHANGE UP (ref 27–34)
MCHC RBC-ENTMCNC: 33.2 PG — SIGNIFICANT CHANGE UP (ref 27–34)
MCHC RBC-ENTMCNC: 33.5 PG — SIGNIFICANT CHANGE UP (ref 27–34)
MCHC RBC-ENTMCNC: 34.4 G/DL — SIGNIFICANT CHANGE UP (ref 32–36)
MCHC RBC-ENTMCNC: 34.7 PG — HIGH (ref 27–34)
MCV RBC AUTO: 100 FL — SIGNIFICANT CHANGE UP (ref 80–100)
MCV RBC AUTO: 101 FL — HIGH (ref 80–100)
MCV RBC AUTO: 103 FL — HIGH (ref 80–100)
MCV RBC AUTO: 98.6 FL — SIGNIFICANT CHANGE UP (ref 80–100)
MONOCYTES # BLD AUTO: 0.7 K/UL — SIGNIFICANT CHANGE UP (ref 0–0.9)
MONOCYTES # BLD AUTO: 0.8 K/UL — SIGNIFICANT CHANGE UP (ref 0–0.9)
MONOCYTES # BLD AUTO: 0.9 K/UL — SIGNIFICANT CHANGE UP (ref 0–0.9)
MONOCYTES # BLD AUTO: 1 K/UL — HIGH (ref 0–0.9)
MONOCYTES # BLD AUTO: 1 K/UL — HIGH (ref 0–0.9)
MONOCYTES # BLD AUTO: 1.2 K/UL — HIGH (ref 0–0.9)
MONOCYTES NFR BLD AUTO: 6.7 % — SIGNIFICANT CHANGE UP (ref 2–14)
MONOCYTES NFR BLD AUTO: 7.2 % — SIGNIFICANT CHANGE UP (ref 2–14)
MONOCYTES NFR BLD AUTO: 7.4 % — SIGNIFICANT CHANGE UP (ref 2–14)
MONOCYTES NFR BLD AUTO: 7.6 % — SIGNIFICANT CHANGE UP (ref 2–14)
MONOCYTES NFR BLD AUTO: 7.8 % — SIGNIFICANT CHANGE UP (ref 2–14)
MONOCYTES NFR BLD AUTO: 7.8 % — SIGNIFICANT CHANGE UP (ref 2–14)
MONOCYTES NFR BLD AUTO: 8.2 % — SIGNIFICANT CHANGE UP (ref 2–14)
MONOCYTES NFR BLD AUTO: 8.4 % — SIGNIFICANT CHANGE UP (ref 2–14)
MONOCYTES NFR BLD AUTO: 9.6 % — SIGNIFICANT CHANGE UP (ref 2–14)
NEUTROPHILS # BLD AUTO: 10.3 K/UL — HIGH (ref 1.8–7.4)
NEUTROPHILS # BLD AUTO: 10.4 K/UL — HIGH (ref 1.8–7.4)
NEUTROPHILS # BLD AUTO: 13.2 K/UL — HIGH (ref 1.8–7.4)
NEUTROPHILS # BLD AUTO: 4.2 K/UL — SIGNIFICANT CHANGE UP (ref 1.8–7.4)
NEUTROPHILS # BLD AUTO: 6.4 K/UL — SIGNIFICANT CHANGE UP (ref 1.8–7.4)
NEUTROPHILS # BLD AUTO: 6.9 K/UL — SIGNIFICANT CHANGE UP (ref 1.8–7.4)
NEUTROPHILS # BLD AUTO: 7.6 K/UL — HIGH (ref 1.8–7.4)
NEUTROPHILS # BLD AUTO: 8 K/UL — HIGH (ref 1.8–7.4)
NEUTROPHILS # BLD AUTO: 9.7 K/UL — HIGH (ref 1.8–7.4)
NEUTROPHILS NFR BLD AUTO: 54.6 % — SIGNIFICANT CHANGE UP (ref 43–77)
NEUTROPHILS NFR BLD AUTO: 73.2 % — SIGNIFICANT CHANGE UP (ref 43–77)
NEUTROPHILS NFR BLD AUTO: 73.4 % — SIGNIFICANT CHANGE UP (ref 43–77)
NEUTROPHILS NFR BLD AUTO: 75.6 % — SIGNIFICANT CHANGE UP (ref 43–77)
NEUTROPHILS NFR BLD AUTO: 77.4 % — HIGH (ref 43–77)
NEUTROPHILS NFR BLD AUTO: 78 % — HIGH (ref 43–77)
NEUTROPHILS NFR BLD AUTO: 79.4 % — HIGH (ref 43–77)
NEUTROPHILS NFR BLD AUTO: 79.9 % — HIGH (ref 43–77)
NEUTROPHILS NFR BLD AUTO: 82.7 % — HIGH (ref 43–77)
PLATELET # BLD AUTO: 235 K/UL — SIGNIFICANT CHANGE UP (ref 150–400)
PLATELET # BLD AUTO: 255 K/UL — SIGNIFICANT CHANGE UP (ref 150–400)
PLATELET # BLD AUTO: 280 K/UL — SIGNIFICANT CHANGE UP (ref 150–400)
PLATELET # BLD AUTO: 313 K/UL — SIGNIFICANT CHANGE UP (ref 150–400)
PLATELET # BLD AUTO: 338 K/UL — SIGNIFICANT CHANGE UP (ref 150–400)
PLATELET # BLD AUTO: 365 K/UL — SIGNIFICANT CHANGE UP (ref 150–400)
PLATELET # BLD AUTO: 367 K/UL — SIGNIFICANT CHANGE UP (ref 150–400)
PLATELET # BLD AUTO: 372 K/UL — SIGNIFICANT CHANGE UP (ref 150–400)
PLATELET # BLD AUTO: 383 K/UL — SIGNIFICANT CHANGE UP (ref 150–400)
POTASSIUM SERPL-SCNC: 3.8 MMOL/L
POTASSIUM SERPL-SCNC: 4.1 MMOL/L
POTASSIUM SERPL-SCNC: 4.5 MMOL/L
POTASSIUM SERPL-SCNC: 5.1 MMOL/L
PROT SERPL-MCNC: 6.2 G/DL
PROT SERPL-MCNC: 6.2 G/DL
PROT SERPL-MCNC: 6.3 G/DL
PROT SERPL-MCNC: 6.3 G/DL
PROT SERPL-MCNC: 6.4 G/DL
PROT SERPL-MCNC: 6.5 G/DL
RBC # BLD: 3.54 M/UL — LOW (ref 3.8–5.2)
RBC # BLD: 3.6 M/UL — LOW (ref 3.8–5.2)
RBC # BLD: 3.6 M/UL — LOW (ref 3.8–5.2)
RBC # BLD: 3.62 M/UL — LOW (ref 3.8–5.2)
RBC # BLD: 3.72 M/UL — LOW (ref 3.8–5.2)
RBC # BLD: 3.74 M/UL — LOW (ref 3.8–5.2)
RBC # BLD: 3.76 M/UL — LOW (ref 3.8–5.2)
RBC # BLD: 3.85 M/UL — SIGNIFICANT CHANGE UP (ref 3.8–5.2)
RBC # BLD: 3.93 M/UL — SIGNIFICANT CHANGE UP (ref 3.8–5.2)
RBC # FLD: 12.4 % — SIGNIFICANT CHANGE UP (ref 10.3–14.5)
RBC # FLD: 13.8 % — SIGNIFICANT CHANGE UP (ref 10.3–14.5)
RBC # FLD: 13.8 % — SIGNIFICANT CHANGE UP (ref 10.3–14.5)
RBC # FLD: 14.1 % — SIGNIFICANT CHANGE UP (ref 10.3–14.5)
RBC # FLD: 14.3 % — SIGNIFICANT CHANGE UP (ref 10.3–14.5)
RBC # FLD: 14.4 % — SIGNIFICANT CHANGE UP (ref 10.3–14.5)
RBC # FLD: 14.5 % — SIGNIFICANT CHANGE UP (ref 10.3–14.5)
RBC # FLD: 15.2 % — HIGH (ref 10.3–14.5)
RBC # FLD: 15.4 % — HIGH (ref 10.3–14.5)
SODIUM SERPL-SCNC: 135 MMOL/L
SODIUM SERPL-SCNC: 135 MMOL/L
SODIUM SERPL-SCNC: 136 MMOL/L
SODIUM SERPL-SCNC: 137 MMOL/L
SODIUM SERPL-SCNC: 137 MMOL/L
SODIUM SERPL-SCNC: 138 MMOL/L
T PALLIDUM AB SER QL IA: NEGATIVE
TSH SERPL-ACNC: 1.39 UIU/ML
TSH SERPL-ACNC: 1.48 UIU/ML
TSH SERPL-ACNC: 2.53 UIU/ML
TSH SERPL-ACNC: 2.91 UIU/ML
TSH SERPL-ACNC: 3.41 UIU/ML
TSH SERPL-ACNC: 6.29 UIU/ML
VIT B12 SERPL-MCNC: 512 PG/ML
VIT B12 SERPL-MCNC: 554 PG/ML
WBC # BLD: 10.6 K/UL — HIGH (ref 3.8–10.5)
WBC # BLD: 12.2 K/UL — HIGH (ref 3.8–10.5)
WBC # BLD: 13 K/UL — HIGH (ref 3.8–10.5)
WBC # BLD: 13.4 K/UL — HIGH (ref 3.8–10.5)
WBC # BLD: 15.9 K/UL — HIGH (ref 3.8–10.5)
WBC # BLD: 7.7 K/UL — SIGNIFICANT CHANGE UP (ref 3.8–10.5)
WBC # BLD: 8.7 K/UL — SIGNIFICANT CHANGE UP (ref 3.8–10.5)
WBC # BLD: 9.4 K/UL — SIGNIFICANT CHANGE UP (ref 3.8–10.5)
WBC # BLD: 9.7 K/UL — SIGNIFICANT CHANGE UP (ref 3.8–10.5)
WBC # FLD AUTO: 10.6 K/UL — HIGH (ref 3.8–10.5)
WBC # FLD AUTO: 12.2 K/UL — HIGH (ref 3.8–10.5)
WBC # FLD AUTO: 13 K/UL — HIGH (ref 3.8–10.5)
WBC # FLD AUTO: 13.4 K/UL — HIGH (ref 3.8–10.5)
WBC # FLD AUTO: 15.9 K/UL — HIGH (ref 3.8–10.5)
WBC # FLD AUTO: 7.7 K/UL — SIGNIFICANT CHANGE UP (ref 3.8–10.5)
WBC # FLD AUTO: 8.7 K/UL — SIGNIFICANT CHANGE UP (ref 3.8–10.5)
WBC # FLD AUTO: 9.4 K/UL — SIGNIFICANT CHANGE UP (ref 3.8–10.5)
WBC # FLD AUTO: 9.7 K/UL — SIGNIFICANT CHANGE UP (ref 3.8–10.5)

## 2019-01-01 PROCEDURE — 88172 CYTP DX EVAL FNA 1ST EA SITE: CPT

## 2019-01-01 PROCEDURE — 99215 OFFICE O/P EST HI 40 MIN: CPT

## 2019-01-01 PROCEDURE — 71045 X-RAY EXAM CHEST 1 VIEW: CPT | Mod: 26,77

## 2019-01-01 PROCEDURE — 70553 MRI BRAIN STEM W/O & W/DYE: CPT | Mod: 26

## 2019-01-01 PROCEDURE — 74177 CT ABD & PELVIS W/CONTRAST: CPT | Mod: 26

## 2019-01-01 PROCEDURE — 77012 CT SCAN FOR NEEDLE BIOPSY: CPT

## 2019-01-01 PROCEDURE — 99204 OFFICE O/P NEW MOD 45 MIN: CPT

## 2019-01-01 PROCEDURE — 99214 OFFICE O/P EST MOD 30 MIN: CPT

## 2019-01-01 PROCEDURE — 88342 IMHCHEM/IMCYTCHM 1ST ANTB: CPT | Mod: 26

## 2019-01-01 PROCEDURE — 88342 IMHCHEM/IMCYTCHM 1ST ANTB: CPT

## 2019-01-01 PROCEDURE — 88305 TISSUE EXAM BY PATHOLOGIST: CPT

## 2019-01-01 PROCEDURE — 99497 ADVNCD CARE PLAN 30 MIN: CPT

## 2019-01-01 PROCEDURE — 88341 IMHCHEM/IMCYTCHM EA ADD ANTB: CPT | Mod: 26

## 2019-01-01 PROCEDURE — 71045 X-RAY EXAM CHEST 1 VIEW: CPT

## 2019-01-01 PROCEDURE — 99205 OFFICE O/P NEW HI 60 MIN: CPT

## 2019-01-01 PROCEDURE — 71260 CT THORAX DX C+: CPT | Mod: 26

## 2019-01-01 PROCEDURE — 88305 TISSUE EXAM BY PATHOLOGIST: CPT | Mod: 26

## 2019-01-01 PROCEDURE — 74177 CT ABD & PELVIS W/CONTRAST: CPT

## 2019-01-01 PROCEDURE — 71045 X-RAY EXAM CHEST 1 VIEW: CPT | Mod: 26

## 2019-01-01 PROCEDURE — 71260 CT THORAX DX C+: CPT

## 2019-01-01 PROCEDURE — A9585: CPT

## 2019-01-01 PROCEDURE — 99205 OFFICE O/P NEW HI 60 MIN: CPT | Mod: 25

## 2019-01-01 PROCEDURE — 88341 IMHCHEM/IMCYTCHM EA ADD ANTB: CPT

## 2019-01-01 PROCEDURE — 32405: CPT

## 2019-01-01 PROCEDURE — 99203 OFFICE O/P NEW LOW 30 MIN: CPT

## 2019-01-01 PROCEDURE — 77012 CT SCAN FOR NEEDLE BIOPSY: CPT | Mod: 26

## 2019-01-01 PROCEDURE — 88173 CYTOPATH EVAL FNA REPORT: CPT

## 2019-01-01 PROCEDURE — 88173 CYTOPATH EVAL FNA REPORT: CPT | Mod: 26

## 2019-01-01 PROCEDURE — 70553 MRI BRAIN STEM W/O & W/DYE: CPT

## 2019-01-01 RX ORDER — ALBUTEROL SULFATE 2.5 MG/3ML
(2.5 MG/3ML) SOLUTION RESPIRATORY (INHALATION) TWICE DAILY
Qty: 1 | Refills: 3 | Status: ACTIVE | COMMUNITY
Start: 2019-01-01 | End: 1900-01-01

## 2019-01-01 RX ORDER — ALBUTEROL SULFATE 90 UG/1
108 (90 BASE) AEROSOL, METERED RESPIRATORY (INHALATION) EVERY 6 HOURS
Qty: 17 | Refills: 3 | Status: ACTIVE | COMMUNITY
Start: 2017-07-31 | End: 1900-01-01

## 2019-01-01 RX ORDER — ATENOLOL 25 MG/1
25 TABLET ORAL
Qty: 90 | Refills: 1 | Status: ACTIVE | COMMUNITY
Start: 2018-08-23 | End: 1900-01-01

## 2019-01-25 ENCOUNTER — APPOINTMENT (OUTPATIENT)
Dept: INTERNAL MEDICINE | Facility: CLINIC | Age: 74
End: 2019-01-25
Payer: MEDICARE

## 2019-01-25 VITALS
HEART RATE: 70 BPM | DIASTOLIC BLOOD PRESSURE: 75 MMHG | TEMPERATURE: 98 F | BODY MASS INDEX: 22.82 KG/M2 | SYSTOLIC BLOOD PRESSURE: 132 MMHG | RESPIRATION RATE: 12 BRPM | OXYGEN SATURATION: 97 % | WEIGHT: 124 LBS | HEIGHT: 62 IN

## 2019-01-25 VITALS — SYSTOLIC BLOOD PRESSURE: 118 MMHG | DIASTOLIC BLOOD PRESSURE: 68 MMHG

## 2019-01-25 PROCEDURE — 99214 OFFICE O/P EST MOD 30 MIN: CPT

## 2019-01-26 NOTE — PLAN
[FreeTextEntry1] : \par 1. Hypertension - better controlled\par low Na diet\par Amlodipine to 10 mg QD\par Atenolol 20 mg QD\par cardio follow up \par \par 2.Hyperlipidemia\par low fat/ low cholesterol diet \par fasting lipids checked today\par \par 3. COPD\par Ventolin prn \par follow up with pulmonary\par \par smoking and alcohol cessation discussed with patient\par Complete bloodwork checked today\par F/U for results

## 2019-01-26 NOTE — HISTORY OF PRESENT ILLNESS
[de-identified] : 73 year old female who is accompanied by her   with h/o COPD, active tobacco use 2 PPD/ ETOH abuse/ / Hypertension/ alcoholic liver disease/ hypercholesterolemia presents for follow up on chronic problems. She denies CP/SOB, fever, chills, difficulty breathing, dizziness, N, V, abdominal pain. She continues to drink 4 glasses of wine daily and smoke 2 PPD and has no intention to change her habits

## 2019-01-26 NOTE — PHYSICAL EXAM
[No Acute Distress] : no acute distress [Well Developed] : well developed [Well-Appearing] : well-appearing [Normal Sclera/Conjunctiva] : normal sclera/conjunctiva [PERRL] : pupils equal round and reactive to light [EOMI] : extraocular movements intact [Normal Outer Ear/Nose] : the outer ears and nose were normal in appearance [Normal Oropharynx] : the oropharynx was normal [Normal TMs] : both tympanic membranes were normal [No JVD] : no jugular venous distention [Supple] : supple [No Lymphadenopathy] : no lymphadenopathy [No Respiratory Distress] : no respiratory distress  [Clear to Auscultation] : lungs were clear to auscultation bilaterally [No Accessory Muscle Use] : no accessory muscle use [Normal Rate] : normal rate  [Regular Rhythm] : with a regular rhythm [Normal S1, S2] : normal S1 and S2 [No Edema] : there was no peripheral edema [Soft] : abdomen soft [Non Tender] : non-tender [Non-distended] : non-distended [Normal Bowel Sounds] : normal bowel sounds [Normal Posterior Cervical Nodes] : no posterior cervical lymphadenopathy [Normal Anterior Cervical Nodes] : no anterior cervical lymphadenopathy [No CVA Tenderness] : no CVA  tenderness [No Spinal Tenderness] : no spinal tenderness [No Joint Swelling] : no joint swelling [Grossly Normal Strength/Tone] : grossly normal strength/tone [No Rash] : no rash [Normal Gait] : normal gait [Coordination Grossly Intact] : coordination grossly intact [No Focal Deficits] : no focal deficits [Speech Grossly Normal] : speech grossly normal [Alert and Oriented x3] : oriented to person, place, and time [de-identified] : flat mood/affect

## 2019-01-29 LAB
25(OH)D3 SERPL-MCNC: 41.6 NG/ML
ALBUMIN SERPL ELPH-MCNC: 4.3 G/DL
ALP BLD-CCNC: 85 U/L
ALT SERPL-CCNC: 6 U/L
ANION GAP SERPL CALC-SCNC: 14 MMOL/L
APPEARANCE: CLEAR
AST SERPL-CCNC: 21 U/L
BACTERIA: ABNORMAL
BASOPHILS # BLD AUTO: 0.06 K/UL
BASOPHILS NFR BLD AUTO: 0.7 %
BILIRUB SERPL-MCNC: 0.5 MG/DL
BILIRUBIN URINE: NEGATIVE
BLOOD URINE: NEGATIVE
BUN SERPL-MCNC: 8 MG/DL
CALCIUM SERPL-MCNC: 9.8 MG/DL
CHLORIDE SERPL-SCNC: 99 MMOL/L
CO2 SERPL-SCNC: 24 MMOL/L
COLOR: YELLOW
CREAT SERPL-MCNC: 0.66 MG/DL
EOSINOPHIL # BLD AUTO: 0.29 K/UL
EOSINOPHIL NFR BLD AUTO: 3.6 %
GLUCOSE QUALITATIVE U: NEGATIVE MG/DL
GLUCOSE SERPL-MCNC: 96 MG/DL
HBA1C MFR BLD HPLC: 5.3 %
HCT VFR BLD CALC: 41.5 %
HGB BLD-MCNC: 13.6 G/DL
HYALINE CASTS: 1 /LPF
IMM GRANULOCYTES NFR BLD AUTO: 0.4 %
KETONES URINE: NEGATIVE
LEUKOCYTE ESTERASE URINE: NEGATIVE
LYMPHOCYTES # BLD AUTO: 2.56 K/UL
LYMPHOCYTES NFR BLD AUTO: 31.9 %
MAN DIFF?: NORMAL
MCHC RBC-ENTMCNC: 32.8 GM/DL
MCHC RBC-ENTMCNC: 33.8 PG
MCV RBC AUTO: 103.2 FL
MICROSCOPIC-UA: NORMAL
MONOCYTES # BLD AUTO: 0.68 K/UL
MONOCYTES NFR BLD AUTO: 8.5 %
NEUTROPHILS # BLD AUTO: 4.4 K/UL
NEUTROPHILS NFR BLD AUTO: 54.9 %
NITRITE URINE: NEGATIVE
PH URINE: 6.5
PLATELET # BLD AUTO: 212 K/UL
POTASSIUM SERPL-SCNC: 4 MMOL/L
PROT SERPL-MCNC: 7.2 G/DL
PROTEIN URINE: NEGATIVE MG/DL
RBC # BLD: 4.02 M/UL
RBC # FLD: 14.1 %
RED BLOOD CELLS URINE: 0 /HPF
SODIUM SERPL-SCNC: 137 MMOL/L
SPECIFIC GRAVITY URINE: 1.01
SQUAMOUS EPITHELIAL CELLS: 0 /HPF
TSH SERPL-ACNC: 3.25 UIU/ML
UROBILINOGEN URINE: NEGATIVE MG/DL
VIT B12 SERPL-MCNC: 335 PG/ML
WBC # FLD AUTO: 8.02 K/UL
WHITE BLOOD CELLS URINE: 3 /HPF

## 2019-02-02 ENCOUNTER — MEDICATION RENEWAL (OUTPATIENT)
Age: 74
End: 2019-02-02

## 2019-02-02 LAB
CHOLEST SERPL-MCNC: 248 MG/DL
CHOLEST/HDLC SERPL: 3.1 RATIO
HDLC SERPL-MCNC: 79 MG/DL
LDLC SERPL CALC-MCNC: 149 MG/DL
TRIGL SERPL-MCNC: 101 MG/DL

## 2019-02-27 ENCOUNTER — APPOINTMENT (OUTPATIENT)
Dept: INTERNAL MEDICINE | Facility: CLINIC | Age: 74
End: 2019-02-27

## 2019-02-27 ENCOUNTER — APPOINTMENT (OUTPATIENT)
Dept: INTERNAL MEDICINE | Facility: CLINIC | Age: 74
End: 2019-02-27
Payer: MEDICARE

## 2019-02-27 VITALS
HEIGHT: 62 IN | SYSTOLIC BLOOD PRESSURE: 122 MMHG | BODY MASS INDEX: 22.08 KG/M2 | TEMPERATURE: 98.2 F | RESPIRATION RATE: 12 BRPM | HEART RATE: 61 BPM | OXYGEN SATURATION: 94 % | DIASTOLIC BLOOD PRESSURE: 74 MMHG | WEIGHT: 120 LBS

## 2019-02-27 PROCEDURE — 99214 OFFICE O/P EST MOD 30 MIN: CPT

## 2019-02-28 ENCOUNTER — MEDICATION RENEWAL (OUTPATIENT)
Age: 74
End: 2019-02-28

## 2019-02-28 RX ORDER — ERGOCALCIFEROL 1.25 MG/1
1.25 MG CAPSULE, LIQUID FILLED ORAL
Qty: 5 | Refills: 0 | Status: ACTIVE | COMMUNITY
Start: 2017-07-13 | End: 1900-01-01

## 2019-03-14 ENCOUNTER — APPOINTMENT (OUTPATIENT)
Dept: PULMONOLOGY | Facility: CLINIC | Age: 74
End: 2019-03-14
Payer: MEDICARE

## 2019-03-14 VITALS
OXYGEN SATURATION: 97 % | HEART RATE: 64 BPM | RESPIRATION RATE: 12 BRPM | SYSTOLIC BLOOD PRESSURE: 114 MMHG | DIASTOLIC BLOOD PRESSURE: 72 MMHG | HEIGHT: 62 IN | BODY MASS INDEX: 23 KG/M2 | WEIGHT: 125 LBS

## 2019-03-14 PROCEDURE — 94726 PLETHYSMOGRAPHY LUNG VOLUMES: CPT

## 2019-03-14 PROCEDURE — 99214 OFFICE O/P EST MOD 30 MIN: CPT | Mod: 25

## 2019-03-14 PROCEDURE — 94729 DIFFUSING CAPACITY: CPT

## 2019-03-14 PROCEDURE — 94060 EVALUATION OF WHEEZING: CPT

## 2019-03-14 NOTE — HISTORY OF PRESENT ILLNESS
[FreeTextEntry1] : Patient is a 74-year-old female with past medical history significant for tobacco alcohol abuse, chronic obstructive pulmonary disease qnd who presents today for follow up.  The patient smokes approximately 2 packs per day. She has developed worsening shortness of breath and dyspnea on exertion over the last few months. She denies fevers chills chest pain weight loss or hemoptysis

## 2019-03-14 NOTE — REVIEW OF SYSTEMS
[Cough] : cough [Hemoptysis] : no hemoptysis [Dyspnea] : dyspnea [Chest Tightness] : no chest tightness [Pleuritic Pain] : no pleuritic pain [Wheezing] : wheezing [Negative] : Sleep Disorder

## 2019-03-14 NOTE — REASON FOR VISIT
[Follow-Up] : a follow-up visit [COPD] : COPD [Cough] : cough [Shortness of Breath] : shortness of Breath [Wheezing] : wheezing [Spouse] : spouse

## 2019-03-14 NOTE — ASSESSMENT
[FreeTextEntry1] : In summary the patient is a 74-year-old female past medical history significant for chronic obstructive pulmonary disease and hypertension who presents for pulmonary followup. The patient's physical exam is significant for diffuse expiratory wheezing bilaterally. The patient underwent a pulmonary function test which revealed obstructive lung disease. The patient is started on Spiriva and is instructed to followup in one month

## 2019-03-14 NOTE — PHYSICAL EXAM
[General Appearance - Well Developed] : well developed [Normal Appearance] : normal appearance [Well Groomed] : well groomed [General Appearance - Well Nourished] : well nourished [No Deformities] : no deformities [General Appearance - In No Acute Distress] : no acute distress [Normal Conjunctiva] : the conjunctiva exhibited no abnormalities [Eyelids - No Xanthelasma] : the eyelids demonstrated no xanthelasmas [Normal Oropharynx] : normal oropharynx [II] : II [Neck Appearance] : the appearance of the neck was normal [Jugular Venous Distention Increased] : there was no jugular-venous distention [Heart Sounds] : normal S1 and S2 [Exaggerated Use Of Accessory Muscles For Inspiration] : no accessory muscle use [Diffuse Wheezing] : diffuse wheezing [Kyphosis] : kyphosis [Bowel Sounds] : normal bowel sounds [Abdomen Soft] : soft [Gait - Sufficient For Exercise Testing] : the gait was sufficient for exercise testing [Nail Clubbing] : no clubbing of the fingernails [Cyanosis, Localized] : no localized cyanosis [] : no rash [No Focal Deficits] : no focal deficits [Oriented To Time, Place, And Person] : oriented to person, place, and time

## 2019-03-21 NOTE — PHYSICAL EXAM
[No Acute Distress] : no acute distress [Well Developed] : well developed [Well-Appearing] : well-appearing [Normal Sclera/Conjunctiva] : normal sclera/conjunctiva [PERRL] : pupils equal round and reactive to light [EOMI] : extraocular movements intact [Normal Outer Ear/Nose] : the outer ears and nose were normal in appearance [Normal Oropharynx] : the oropharynx was normal [Normal TMs] : both tympanic membranes were normal [No JVD] : no jugular venous distention [Supple] : supple [No Lymphadenopathy] : no lymphadenopathy [No Respiratory Distress] : no respiratory distress  [No Accessory Muscle Use] : no accessory muscle use [Normal Rate] : normal rate  [Regular Rhythm] : with a regular rhythm [Normal S1, S2] : normal S1 and S2 [No Edema] : there was no peripheral edema [Soft] : abdomen soft [Non Tender] : non-tender [Non-distended] : non-distended [Normal Bowel Sounds] : normal bowel sounds [Normal Posterior Cervical Nodes] : no posterior cervical lymphadenopathy [Normal Anterior Cervical Nodes] : no anterior cervical lymphadenopathy [No CVA Tenderness] : no CVA  tenderness [No Spinal Tenderness] : no spinal tenderness [No Joint Swelling] : no joint swelling [Grossly Normal Strength/Tone] : grossly normal strength/tone [No Rash] : no rash [Normal Gait] : normal gait [Coordination Grossly Intact] : coordination grossly intact [No Focal Deficits] : no focal deficits [Speech Grossly Normal] : speech grossly normal [Alert and Oriented x3] : oriented to person, place, and time [de-identified] : + scattered rhonchi  [de-identified] : flat mood/affect

## 2019-03-21 NOTE — HISTORY OF PRESENT ILLNESS
[de-identified] : 73 year old female, accompannied by her , with h/o COPD, active tobacco use 2 PPD/ ETOH abuse/ / Hypertension/ alcoholic liver disease/ hypercholesterolemia presents for follow up visit\par Pt's  says that pt always has a cough. Pt says she only coughs when she gets a cold and sometimes she hears some wheezing\par She denies SOB, fever, chills, difficulty breathing, dizziness, N, V, abdominal pain. She continues to drink 4 glasses of wine daily and smoke 2 PPD

## 2019-04-08 ENCOUNTER — APPOINTMENT (OUTPATIENT)
Dept: PULMONOLOGY | Facility: CLINIC | Age: 74
End: 2019-04-08
Payer: MEDICARE

## 2019-04-08 VITALS
RESPIRATION RATE: 14 BRPM | HEIGHT: 62 IN | BODY MASS INDEX: 22.63 KG/M2 | WEIGHT: 123 LBS | SYSTOLIC BLOOD PRESSURE: 156 MMHG | OXYGEN SATURATION: 97 % | HEART RATE: 93 BPM | DIASTOLIC BLOOD PRESSURE: 74 MMHG

## 2019-04-08 DIAGNOSIS — R93.89 ABNORMAL FINDINGS ON DIAGNOSTIC IMAGING OF OTHER SPECIFIED BODY STRUCTURES: ICD-10-CM

## 2019-04-08 DIAGNOSIS — Z82.49 FAMILY HISTORY OF ISCHEMIC HEART DISEASE AND OTHER DISEASES OF THE CIRCULATORY SYSTEM: ICD-10-CM

## 2019-04-08 PROCEDURE — 99214 OFFICE O/P EST MOD 30 MIN: CPT

## 2019-04-08 NOTE — REASON FOR VISIT
[Follow-Up] : a follow-up visit [Abnormal CT Scan] : abnormal CT Scan [Cough] : cough [Lung Cancer] : lung cancer [Shortness of Breath] : shortness of Breath [Wheezing] : wheezing [Spouse] : spouse

## 2019-04-08 NOTE — ASSESSMENT
[FreeTextEntry1] : In summary the patient is a 74-year-old female with tobacco abuse, chronic obstructive pulmonary disease presents for an abnormal CT of the chest. The patient's physical exam is significant for scattered wheezing bilaterally. A computed axial tomography scan has been ordered and the patient is instructed to followup in 2 weeks

## 2019-04-08 NOTE — HISTORY OF PRESENT ILLNESS
[FreeTextEntry1] : Patient is a 74-year-old female with past medical history significant for chronic obstructive pulmonary disease, active smoker who presents for followup. The patient had a recent CT chest which revealed multiple pulmonary nodules as well as areas with groundglass changes consistent and worrisome for metastatic lung cancer. She continues to smoke 2 packs per day. She denies fevers chills chest pain weight loss or hemoptysis

## 2019-04-10 ENCOUNTER — RX RENEWAL (OUTPATIENT)
Age: 74
End: 2019-04-10

## 2019-04-29 ENCOUNTER — RX RENEWAL (OUTPATIENT)
Age: 74
End: 2019-04-29

## 2019-05-01 ENCOUNTER — NON-APPOINTMENT (OUTPATIENT)
Age: 74
End: 2019-05-01

## 2019-05-01 ENCOUNTER — APPOINTMENT (OUTPATIENT)
Dept: PULMONOLOGY | Facility: CLINIC | Age: 74
End: 2019-05-01
Payer: MEDICARE

## 2019-05-01 ENCOUNTER — APPOINTMENT (OUTPATIENT)
Dept: CARDIOLOGY | Facility: CLINIC | Age: 74
End: 2019-05-01
Payer: MEDICARE

## 2019-05-01 VITALS
DIASTOLIC BLOOD PRESSURE: 73 MMHG | HEIGHT: 62 IN | OXYGEN SATURATION: 95 % | SYSTOLIC BLOOD PRESSURE: 114 MMHG | TEMPERATURE: 97.3 F | WEIGHT: 124 LBS | HEART RATE: 69 BPM | RESPIRATION RATE: 12 BRPM | BODY MASS INDEX: 22.82 KG/M2

## 2019-05-01 VITALS — DIASTOLIC BLOOD PRESSURE: 70 MMHG | SYSTOLIC BLOOD PRESSURE: 105 MMHG

## 2019-05-01 DIAGNOSIS — F17.200 NICOTINE DEPENDENCE, UNSPECIFIED, UNCOMPLICATED: ICD-10-CM

## 2019-05-01 DIAGNOSIS — Z86.79 PERSONAL HISTORY OF OTHER DISEASES OF THE CIRCULATORY SYSTEM: ICD-10-CM

## 2019-05-01 DIAGNOSIS — Z87.891 PERSONAL HISTORY OF NICOTINE DEPENDENCE: ICD-10-CM

## 2019-05-01 DIAGNOSIS — Z86.69 PERSONAL HISTORY OF OTHER DISEASES OF THE NERVOUS SYSTEM AND SENSE ORGANS: ICD-10-CM

## 2019-05-01 DIAGNOSIS — Z82.0 FAMILY HISTORY OF EPILEPSY AND OTHER DISEASES OF THE NERVOUS SYSTEM: ICD-10-CM

## 2019-05-01 DIAGNOSIS — Z87.898 PERSONAL HISTORY OF OTHER SPECIFIED CONDITIONS: ICD-10-CM

## 2019-05-01 PROCEDURE — 99214 OFFICE O/P EST MOD 30 MIN: CPT

## 2019-05-01 PROCEDURE — 93000 ELECTROCARDIOGRAM COMPLETE: CPT

## 2019-05-01 NOTE — HISTORY OF PRESENT ILLNESS
[FreeTextEntry1] : Brandi is a 74-year-old female smoker, COPD,lung cancer, hypertension with occasional dizziness when getting up from supine position. She is very hard of hearing. Here today with daughter. Awaiting CT guided thoracentesis for possible metastatic disease.

## 2019-05-01 NOTE — DISCUSSION/SUMMARY
[___ Week(s)] : [unfilled] week(s) [FreeTextEntry1] : The patient is a 74-year-old female smoker, hypertension, hyperlipidemia, COPD, lung cancer who is orthostatic and symptomatic. \par #1 Htn- BP too low, cut amlodipine down to 5mg and atenolol to 12.5mg and reevaluate\par #2 Pulm- lung cancer, awaiting diagnostic thoracentesis, ECHO to evaluate LV

## 2019-05-01 NOTE — REVIEW OF SYSTEMS
[Negative] : Heme/Lymph [Recent Weight Gain (___ Lbs)] : no recent weight gain [Recent Weight Loss (___ Lbs)] : no recent weight loss

## 2019-05-01 NOTE — PHYSICAL EXAM
[Normal Appearance] : normal appearance [General Appearance - Well Developed] : well developed [General Appearance - Well Nourished] : well nourished [Well Groomed] : well groomed [Normal Conjunctiva] : the conjunctiva exhibited no abnormalities [General Appearance - In No Acute Distress] : no acute distress [No Deformities] : no deformities [Eyelids - No Xanthelasma] : the eyelids demonstrated no xanthelasmas [Normal Oral Mucosa] : normal oral mucosa [No Oral Pallor] : no oral pallor [Normal Jugular Venous A Waves Present] : normal jugular venous A waves present [No Oral Cyanosis] : no oral cyanosis [Normal Jugular Venous V Waves Present] : normal jugular venous V waves present [No Jugular Venous Armas A Waves] : no jugular venous armas A waves [Heart Rate And Rhythm] : heart rate and rhythm were normal [Heart Sounds] : normal S1 and S2 [Murmurs] : no murmurs present [Exaggerated Use Of Accessory Muscles For Inspiration] : no accessory muscle use [Respiration, Rhythm And Depth] : normal respiratory rhythm and effort [Diffuse Wheezing] : diffuse wheezing [Gait - Sufficient For Exercise Testing] : the gait was sufficient for exercise testing [Abnormal Walk] : normal gait [Nail Clubbing] : no clubbing of the fingernails [Petechial Hemorrhages (___cm)] : no petechial hemorrhages [Cyanosis, Localized] : no localized cyanosis [No Venous Stasis] : no venous stasis [Skin Color & Pigmentation] : normal skin color and pigmentation [No Skin Ulcers] : no skin ulcer [Skin Lesions] : no skin lesions [No Xanthoma] : no  xanthoma was observed [Oriented To Time, Place, And Person] : oriented to person, place, and time [Affect] : the affect was normal [Mood] : the mood was normal [No Anxiety] : not feeling anxious [Abdomen Soft] : soft [Abdomen Tenderness] : non-tender [] : no hepato-splenomegaly [Abdomen Mass (___ Cm)] : no abdominal mass palpated

## 2019-05-02 PROBLEM — F17.200 HEAVY TOBACCO SMOKER: Status: ACTIVE | Noted: 2017-07-10

## 2019-05-02 NOTE — ASSESSMENT
[FreeTextEntry1] : In summary the patient is a 74-year-old female who was recently diagnosed with stage IIIB lung cancer via PET scan. The patient is not a good candidate for bronchoscopy and therefore after a long discussion with the patient's daughter we will proceed with a CT-guided biopsy. The patient is now being referred to cardiology preoperatively. A CT-guided biopsy with interventional radiology is being scheduled

## 2019-05-02 NOTE — HISTORY OF PRESENT ILLNESS
[FreeTextEntry1] : Patient is a 74-year-old female with past medical history significant for chronic obstructive pulmonary disease, active smoker who presents for followup. The patient had a recent CT chest which revealed multiple pulmonary nodules as well as areas with groundglass changes consistent and worrisome for metastatic lung cancer. She continues to smoke 2 packs per day. She denies fevers chills chest pain weight loss or hemoptysis. The  patient underwent a PET scan which was positive and she presents today with her daughter for further management

## 2019-05-02 NOTE — REVIEW OF SYSTEMS
[Cough] : cough [Hemoptysis] : no hemoptysis [Dyspnea] : dyspnea [Wheezing] : wheezing [Chest Tightness] : no chest tightness [Pleuritic Pain] : no pleuritic pain [Negative] : Sleep Disorder

## 2019-05-02 NOTE — PHYSICAL EXAM
[General Appearance - Well Developed] : well developed [No Deformities] : no deformities [General Appearance - Well Nourished] : well nourished [Normal Appearance] : normal appearance [Well Groomed] : well groomed [General Appearance - In No Acute Distress] : no acute distress [Normal Conjunctiva] : the conjunctiva exhibited no abnormalities [Normal Oropharynx] : normal oropharynx [Eyelids - No Xanthelasma] : the eyelids demonstrated no xanthelasmas [II] : II [Neck Appearance] : the appearance of the neck was normal [Exaggerated Use Of Accessory Muscles For Inspiration] : no accessory muscle use [Jugular Venous Distention Increased] : there was no jugular-venous distention [Diffuse Wheezing] : diffuse wheezing [Heart Sounds] : normal S1 and S2 [Bowel Sounds] : normal bowel sounds [Kyphosis] : kyphosis [Abdomen Soft] : soft [Cyanosis, Localized] : no localized cyanosis [Nail Clubbing] : no clubbing of the fingernails [Gait - Sufficient For Exercise Testing] : the gait was sufficient for exercise testing [] : no rash [No Focal Deficits] : no focal deficits [Oriented To Time, Place, And Person] : oriented to person, place, and time

## 2019-05-02 NOTE — REASON FOR VISIT
[Follow-Up] : a follow-up visit [Abnormal CT Scan] : abnormal CT Scan [Lung Cancer] : lung cancer [Family Member] : family member

## 2019-05-03 ENCOUNTER — RX RENEWAL (OUTPATIENT)
Age: 74
End: 2019-05-03

## 2019-05-06 ENCOUNTER — LABORATORY RESULT (OUTPATIENT)
Age: 74
End: 2019-05-06

## 2019-05-07 LAB
BASOPHILS # BLD AUTO: 0.06 K/UL
BASOPHILS NFR BLD AUTO: 0.8 %
EOSINOPHIL # BLD AUTO: 0.13 K/UL
EOSINOPHIL NFR BLD AUTO: 1.7 %
HCT VFR BLD CALC: 41.8 %
HGB BLD-MCNC: 13.2 G/DL
IMM GRANULOCYTES NFR BLD AUTO: 0.4 %
INR PPP: 0.97 RATIO
LYMPHOCYTES # BLD AUTO: 1.55 K/UL
LYMPHOCYTES NFR BLD AUTO: 20.1 %
MAN DIFF?: NORMAL
MCHC RBC-ENTMCNC: 31.6 GM/DL
MCHC RBC-ENTMCNC: 33.7 PG
MCV RBC AUTO: 106.6 FL
MONOCYTES # BLD AUTO: 0.95 K/UL
MONOCYTES NFR BLD AUTO: 12.3 %
NEUTROPHILS # BLD AUTO: 4.99 K/UL
NEUTROPHILS NFR BLD AUTO: 64.7 %
PLATELET # BLD AUTO: 212 K/UL
PT BLD: 11.1 SEC
RBC # BLD: 3.92 M/UL
RBC # FLD: 13.4 %
WBC # FLD AUTO: 7.71 K/UL

## 2019-05-13 ENCOUNTER — APPOINTMENT (OUTPATIENT)
Dept: CARDIOLOGY | Facility: CLINIC | Age: 74
End: 2019-05-13
Payer: MEDICARE

## 2019-05-13 VITALS
BODY MASS INDEX: 22.82 KG/M2 | HEIGHT: 62 IN | OXYGEN SATURATION: 79 % | RESPIRATION RATE: 12 BRPM | WEIGHT: 124 LBS | DIASTOLIC BLOOD PRESSURE: 78 MMHG | HEART RATE: 138 BPM | SYSTOLIC BLOOD PRESSURE: 145 MMHG

## 2019-05-13 DIAGNOSIS — E78.5 HYPERLIPIDEMIA, UNSPECIFIED: ICD-10-CM

## 2019-05-13 DIAGNOSIS — R42 DIZZINESS AND GIDDINESS: ICD-10-CM

## 2019-05-13 DIAGNOSIS — E78.00 PURE HYPERCHOLESTEROLEMIA, UNSPECIFIED: ICD-10-CM

## 2019-05-13 PROCEDURE — 93306 TTE W/DOPPLER COMPLETE: CPT

## 2019-05-13 PROCEDURE — 99214 OFFICE O/P EST MOD 30 MIN: CPT | Mod: 25

## 2019-05-13 NOTE — PHYSICAL EXAM
[General Appearance - Well Developed] : well developed [Normal Appearance] : normal appearance [Well Groomed] : well groomed [General Appearance - Well Nourished] : well nourished [General Appearance - In No Acute Distress] : no acute distress [No Deformities] : no deformities [Normal Conjunctiva] : the conjunctiva exhibited no abnormalities [Eyelids - No Xanthelasma] : the eyelids demonstrated no xanthelasmas [Normal Oral Mucosa] : normal oral mucosa [No Oral Pallor] : no oral pallor [No Oral Cyanosis] : no oral cyanosis [Normal Jugular Venous A Waves Present] : normal jugular venous A waves present [Normal Jugular Venous V Waves Present] : normal jugular venous V waves present [No Jugular Venous Armas A Waves] : no jugular venous armas A waves [Respiration, Rhythm And Depth] : normal respiratory rhythm and effort [Exaggerated Use Of Accessory Muscles For Inspiration] : no accessory muscle use [Diffuse Wheezing] : diffuse wheezing [Heart Rate And Rhythm] : heart rate and rhythm were normal [Heart Sounds] : normal S1 and S2 [Murmurs] : no murmurs present [Abdomen Soft] : soft [Abdomen Tenderness] : non-tender [Abdomen Mass (___ Cm)] : no abdominal mass palpated [Abnormal Walk] : normal gait [Gait - Sufficient For Exercise Testing] : the gait was sufficient for exercise testing [Nail Clubbing] : no clubbing of the fingernails [Cyanosis, Localized] : no localized cyanosis [Petechial Hemorrhages (___cm)] : no petechial hemorrhages [Skin Color & Pigmentation] : normal skin color and pigmentation [] : no rash [No Venous Stasis] : no venous stasis [Skin Lesions] : no skin lesions [No Skin Ulcers] : no skin ulcer [No Xanthoma] : no  xanthoma was observed [Oriented To Time, Place, And Person] : oriented to person, place, and time [Affect] : the affect was normal [Mood] : the mood was normal [No Anxiety] : not feeling anxious

## 2019-05-13 NOTE — HISTORY OF PRESENT ILLNESS
[FreeTextEntry1] : Brandi is a 74-year-old female smoker, COPD,lung cancer, hypertension with occasional dizziness but less than previously. She is very hard of hearing. Here alone today.

## 2019-05-13 NOTE — DISCUSSION/SUMMARY
[___ Week(s)] : [unfilled] week(s) [FreeTextEntry1] : The patient is a 74-year-old female smoker, hypertension, hyperlipidemia, COPD, lung cancer whose dizziness has improved\par #1 Htn- BP higher on lower dose amlodipine and atenolol but less symptomatic from hypotension, will continue for now \par #2 Pulm- lung cancer, awaiting diagnostic thoracentesis, ECHO negative for effusion and preliminary normal LV function

## 2019-05-13 NOTE — REVIEW OF SYSTEMS
[Negative] : Heme/Lymph [Recent Weight Loss (___ Lbs)] : no recent weight loss [Recent Weight Gain (___ Lbs)] : no recent weight gain

## 2019-06-20 PROBLEM — R26.89 POOR BALANCE: Status: ACTIVE | Noted: 2019-01-01

## 2019-06-20 NOTE — ASSESSMENT
[FreeTextEntry1] : 74f with recently diagnosed likely extensive stage SCLC, presenting with her 2 daughters to establish care.\par Has an ECOG performance status of 2, weakness and unstable gait.\par Standard of care treatment options were discussed with patient and her daughters. They understand that the treatment is palliative in nature and cure is not possible. Standard of care first line treatment is chemo/immunotherapy with carboplatin/etoposide/atezolizumab, however patient may not be a good candidate for chemotherapy. Immunotherapy which is the second+ line of therapy can be considered. Another option is best supportive care. \par \par -Labs today, CBC, CMP, TSH, LDH\par -Social work consult\par -CT c/a/p with contrast for staging\par -MRI brain for staging\par -Palliative care consult\par -RTC 2 weeks after the above work up to discuss treamtnet options. May consider immunotherapy for treatment as patient is not a good chemotherapy candidate.

## 2019-06-20 NOTE — HISTORY OF PRESENT ILLNESS
[Disease: _____________________] : Disease: [unfilled] [de-identified] : 74f, active smoker with a >50 pack year smoking history, EtOH dependent, drinks 4 glasses of wine daily, hard of hearing, with COPD who was found to have multiple lung nodules on the left and 1 nodule on the right, concerning for metastatic lung cancer on PET/CT 4/2019, s/p IR guided biopsy of a lung nodule on 6/3/2019, showing SCLC. \par Ms Farrell presents for an initial visit. \par She denies pain. She notes that she has been losing weight and has felt increasingly weak in the past few months. Her appetite is poor and she has decreased PO intake, often times only drinking tea/coffee and toast during the day. She has an unstable gait and needs help with performing ADLs. She lives with her  who is also unwell with medical problems.

## 2019-06-20 NOTE — PHYSICAL EXAM
[Ambulatory and capable of all self care but unable to carry out any work activities] : Status 2- Ambulatory and capable of all self care but unable to carry out any work activities. Up and about more than 50% of waking hours [Thin] : thin [Normal] : grossly intact [de-identified] : diffuse crackles

## 2019-06-20 NOTE — REVIEW OF SYSTEMS
[Fatigue] : fatigue [Loss of Hearing] : loss of hearing [Wheezing] : wheezing [Shortness Of Breath] : shortness of breath [Cough] : cough [SOB on Exertion] : shortness of breath during exertion [Negative] : Allergic/Immunologic

## 2019-07-10 PROBLEM — R60.0 LOWER EXTREMITY EDEMA: Status: ACTIVE | Noted: 2019-01-01

## 2019-07-12 NOTE — REVIEW OF SYSTEMS
[Fatigue] : fatigue [Lower Ext Edema] : lower extremity edema [Shortness Of Breath] : shortness of breath [Wheezing] : wheezing [Cough] : cough [SOB on Exertion] : shortness of breath during exertion [Negative] : Allergic/Immunologic

## 2019-07-12 NOTE — PHYSICAL EXAM
[Ambulatory and capable of all self care but unable to carry out any work activities] : Status 2- Ambulatory and capable of all self care but unable to carry out any work activities. Up and about more than 50% of waking hours [Thin] : thin [Normal] : affect appropriate [de-identified] : crackles and wheezing [de-identified] : b/l LE edema

## 2019-07-12 NOTE — HISTORY OF PRESENT ILLNESS
[Disease: _____________________] : Disease: [unfilled] [de-identified] : 74f, active smoker with a >50 pack year smoking history, EtOH dependent, drinks 4 glasses of wine daily, hard of hearing, with COPD, baseline O2 sat at rest 91%, who was found to have multiple lung nodules on the left and 1 nodule on the right, concerning for metastatic lung cancer on PET/CT 4/2019, s/p IR guided biopsy of a lung nodule on 6/3/2019, showing SCLC. \par Ms Farrell presents for a follow up visit.\par \par CT c/a/p 7/3/2019:  New near complete collapse of the right lower lobe secondary to \par obstruction of the right lower lobe bronchus. Heterogeneous enhancement \par of the right lower lung may be related to mucoid impacted airways and/or \par spread of tumor. Additional pulmonary nodules/masses are without \par significant change.\par Indeterminant hypoattenuating lesions within the spleen.\par Indeterminate bilateral adrenal nodules.\par Nonspecific hyperenhancement involving the endometrial cavity of \par uncertain etiology; consider correlation with pelvic sonogram.\par \par MRI Brain 7/3/2019: Motion limited study demonstrating moderate to marked volume loss and \par white matter T2 hyperintensities likely microvascular disease, without \par evidence for any restricted diffusion, hemorrhage, or midline shift.\par Loss of T1 marrow signal in clivus, skull base, and calvaria with \par hyperintense ADC signal, with known small cell carcinoma, osseous \par metastasis are not excluded, improved assessment via nuclear medicine \par bone scan or repeat PET/CT  may be obtained as clinically warranted.\par  \par She denies pain. She notes that she has been losing weight and has felt increasingly weak in the past few months. Her appetite is poor and she has decreased PO intake, often times only drinking tea/coffee and toast during the day. She has an unstable gait and needs help with performing ADLs. She lives with her  who is also unwell with medical problems.  [de-identified] : \bridger Paz presents for follow up. She reports worsening SOB and decreasing energy level. She is still undecided about accepting treatment vs supportive care only. \par

## 2019-07-12 NOTE — ASSESSMENT
[FreeTextEntry1] : 74f with recently diagnosed likely extensive stage SCLC, presenting with her 2 daughters to establish care.\par Has an ECOG performance status of 2, weakness and unstable gait.\par Standard of care treatment options were AGAIN discussed with patient and her daughters. They understand that the treatment is palliative in nature and cure is not possible. Standard of care first line treatment is chemo/immunotherapy with carboplatin/etoposide/atezolizumab, however patient may not be a good candidate for chemotherapy. Immunotherapy which is the second+ line of therapy can be considered. Another option is best supportive care. \par \par -Patient is leaning towards supportive care, but her daughters are eager to try therapy and no decision has been made yet. Family will call us to inform us of their decision regarding treatment vs supportive care\par -Social work consult\par -Palliative care consult\par

## 2019-07-18 PROBLEM — I10 ESSENTIAL (PRIMARY) HYPERTENSION: Chronic | Status: ACTIVE | Noted: 2019-01-01

## 2019-07-18 PROBLEM — J44.9 CHRONIC OBSTRUCTIVE PULMONARY DISEASE, UNSPECIFIED: Chronic | Status: ACTIVE | Noted: 2019-01-01

## 2019-07-18 PROBLEM — Z86.69 PERSONAL HISTORY OF OTHER DISEASES OF THE NERVOUS SYSTEM AND SENSE ORGANS: Chronic | Status: ACTIVE | Noted: 2019-01-01

## 2019-07-18 PROBLEM — F17.200 NICOTINE DEPENDENCE, UNSPECIFIED, UNCOMPLICATED: Chronic | Status: ACTIVE | Noted: 2019-01-01

## 2019-07-30 NOTE — HISTORY OF PRESENT ILLNESS
[Disease: _____________________] : Disease: [unfilled] [de-identified] : 74f, active smoker with a >50 pack year smoking history, EtOH dependent, drinks 4 glasses of wine daily, hard of hearing, with COPD, baseline O2 sat at rest 91%, who was found to have multiple lung nodules on the left and 1 nodule on the right, concerning for metastatic lung cancer on PET/CT 4/2019, s/p IR guided biopsy of a lung nodule on 6/3/2019, showing SCLC. \par Ms Farrell presents for a follow up visit.\par \par CT c/a/p 7/3/2019:  New near complete collapse of the right lower lobe secondary to \par obstruction of the right lower lobe bronchus. Heterogeneous enhancement \par of the right lower lung may be related to mucoid impacted airways and/or \par spread of tumor. Additional pulmonary nodules/masses are without \par significant change.\par Indeterminant hypoattenuating lesions within the spleen.\par Indeterminate bilateral adrenal nodules.\par Nonspecific hyperenhancement involving the endometrial cavity of \par uncertain etiology; consider correlation with pelvic sonogram.\par \par MRI Brain 7/3/2019: Motion limited study demonstrating moderate to marked volume loss and \par white matter T2 hyperintensities likely microvascular disease, without \par evidence for any restricted diffusion, hemorrhage, or midline shift.\par Loss of T1 marrow signal in clivus, skull base, and calvaria with \par hyperintense ADC signal, with known small cell carcinoma, osseous \par metastasis are not excluded, improved assessment via nuclear medicine \par bone scan or repeat PET/CT  may be obtained as clinically warranted.\par  \par She denies pain. She notes that she has been losing weight and has felt increasingly weak in the past few months. Her appetite is poor and she has decreased PO intake, often times only drinking tea/coffee and toast during the day. She has an unstable gait and needs help with performing ADLs. She lives with her  who is also unwell with medical problems.  [de-identified] : Patient presents for follow up and to start on treatment. Pt and her 2 daughters thought about immunotherapy vs comfort measures only and decided on starting treatment. \par Ms Bud feels weak and fatigued, she has a poor appetite and has SOB. \par

## 2019-07-30 NOTE — ASSESSMENT
[FreeTextEntry1] : 74f with recently diagnosed likely extensive stage SCLC, presenting with her 2 daughters to establish care.\par Has an ECOG performance status of 2, weakness and unstable gait.\par Standard of care treatment options were discussed again. Standard of care first line treatment is chemo/immunotherapy with carboplatin/etoposide/atezolizumab, however patient is not a good candidate for chemotherapy. Immunotherapy which is the second+ line of therapy can be considered. Another option is best supportive care. Patient and family decided to pursue immunotherapy, they understand that this is not a first line standard option and that treatment is palliative in nature and not curative. \par Will start single agent keytruda.\par \par -will start single agent keytruda, C1 7/24\par -Labs today\par -Pulm consult, communicated with Dr Lua\par -Palliative care consult\par -RTC prior to C2\par

## 2019-07-30 NOTE — REVIEW OF SYSTEMS
[Chills] : no chills [Fever] : no fever [Night Sweats] : no night sweats [Fatigue] : fatigue [Recent Change In Weight] : ~T recent weight change [Shortness Of Breath] : shortness of breath [Cough] : cough [Wheezing] : wheezing [SOB on Exertion] : shortness of breath during exertion [Negative] : Allergic/Immunologic

## 2019-07-30 NOTE — PHYSICAL EXAM
[Thin] : thin [Ambulatory and capable of all self care but unable to carry out any work activities] : Status 2- Ambulatory and capable of all self care but unable to carry out any work activities. Up and about more than 50% of waking hours [Normal] : affect appropriate [de-identified] : few crackles

## 2019-08-15 PROBLEM — R63.0 LOSS OF APPETITE: Status: ACTIVE | Noted: 2019-01-01

## 2019-08-15 NOTE — ASSESSMENT
[FreeTextEntry1] : 74f with extensive stage SCLC, presenting for follow up.\par Patient is not a good candidate for chemotherapy. Immunotherapy which is the second+ line of therapy was elected.\par She tolerated C1 well and is presenting prior to C2. \par \par -C2 pembrolizumab 8/16\par -Labs today\par -F/u Pal care and Pulm consult\par -RTC prior to C3\par

## 2019-08-15 NOTE — HISTORY OF PRESENT ILLNESS
[Disease: _____________________] : Disease: [unfilled] [de-identified] : 74f, active smoker with a >50 pack year smoking history, EtOH dependent, drinks 4 glasses of wine daily, hard of hearing, with COPD, baseline O2 sat at rest 91%, who was found to have multiple lung nodules on the left and 1 nodule on the right, concerning for metastatic lung cancer on PET/CT 4/2019, s/p IR guided biopsy of a lung nodule on 6/3/2019, showing SCLC. \par \par CT c/a/p 7/3/2019:  New near complete collapse of the right lower lobe secondary to \par obstruction of the right lower lobe bronchus. Heterogeneous enhancement \par of the right lower lung may be related to mucoid impacted airways and/or \par spread of tumor. Additional pulmonary nodules/masses are without \par significant change.\par Indeterminant hypoattenuating lesions within the spleen.\par Indeterminate bilateral adrenal nodules.\par Nonspecific hyperenhancement involving the endometrial cavity of \par uncertain etiology; consider correlation with pelvic sonogram.\par \par MRI Brain 7/3/2019: Motion limited study demonstrating moderate to marked volume loss and \par white matter T2 hyperintensities likely microvascular disease, without \par evidence for any restricted diffusion, hemorrhage, or midline shift.\par Loss of T1 marrow signal in clivus, skull base, and calvaria with \par hyperintense ADC signal, with known small cell carcinoma, osseous \par metastasis are not excluded, improved assessment via nuclear medicine \par bone scan or repeat PET/CT  may be obtained as clinically warranted.\par  \par She denies pain. She notes that she has been losing weight and has felt increasingly weak in the past few months. Her appetite is poor and she has decreased PO intake, often times only drinking tea/coffee and toast during the day. She has an unstable gait and needs help with performing ADLs. She lives with her  who is also unwell with medical problems.  [de-identified] : Ms Farrell presents for follow up s/p C1 pembrolizumab. \par She feels better overall. reports SOB, appetite and fatigue are slightly better. \par She has been seen by pulm and pal care. \par She denies diarrhea, rash, worsening SOB, continues to have cough, continues to smoke and drink.

## 2019-08-15 NOTE — REVIEW OF SYSTEMS
[Fatigue] : fatigue [Shortness Of Breath] : shortness of breath [Wheezing] : wheezing [SOB on Exertion] : shortness of breath during exertion [Negative] : Allergic/Immunologic

## 2019-08-19 NOTE — PHYSICAL EXAM
[Well Groomed] : well groomed [Normal Oropharynx] : normal oropharynx [Neck Appearance] : the appearance of the neck was normal [Heart Rate And Rhythm] : heart rate and rhythm were normal [Heart Sounds] : normal S1 and S2 [] : no respiratory distress [Respiration, Rhythm And Depth] : normal respiratory rhythm and effort [Kyphosis] : kyphosis [Bowel Sounds] : normal bowel sounds [Abdomen Soft] : soft [Gait - Sufficient For Exercise Testing] : the gait was sufficient for exercise testing [Nail Clubbing] : no clubbing of the fingernails [Cyanosis, Localized] : no localized cyanosis [Skin Color & Pigmentation] : normal skin color and pigmentation [Skin Turgor] : normal skin turgor [No Focal Deficits] : no focal deficits [Oriented To Time, Place, And Person] : oriented to person, place, and time [Affect] : the affect was normal [FreeTextEntry1] : decreased BS at the right base with superimposed crackles [FreeTextEntry2] : no edema

## 2019-08-19 NOTE — ASSESSMENT
[FreeTextEntry1] : 74 yof with heavy smoking history over 50 packs a year, current smoker, diagnosed with metastatic SCLCa by IR guided biopsy of a lung nodule on 6/3/2019. \par \par Bronchoscopy for evaluation of RLL collapse was discussed with patient and her daughter. Both seem to be in favor of less invasive approach such as airway clearance rather than bronchoscopy. They would not be interested in any invasive procedure in case of tumor obstruction of RLL, therefore trial of non-invasive mucous clearance makes sense.\par Will start nebulized albuterol with aerobika BID.\par Will check PFT's although initiation of long acting BD is less likely going to help with symptoms in absence of dyspnea symptoms and while she continuos smoking.\par OV in few weeks.

## 2019-08-19 NOTE — REVIEW OF SYSTEMS
[Fatigue] : fatigue [Poor Appetite] : poor appetite [Recent Wt Loss (___ Lbs)] : recent [unfilled] ~Ulb weight loss [Cough] : cough [Immunocompromised] : immunocompromised disease [Arthralgias] : arthralgias [Negative] : Neurologic [Fever] : no fever [Chills] : no chills [Recent Wt Gain (___ Lbs)] : no recent weight gain [Sputum] : not coughing up ~M sputum [Hemoptysis] : no hemoptysis [Dyspnea] : no dyspnea [Chest Tightness] : no chest tightness [Pleuritic Pain] : no pleuritic pain [Wheezing] : no wheezing [PND] : no PND [Orthopnea] : no orthopnea [Edema] : ~T edema was not present [FreeTextEntry6] : knees [FreeTextEntry8] : occasional tightening on chest, cough for 3 weeks, nonproductive

## 2019-08-19 NOTE — HISTORY OF PRESENT ILLNESS
[FreeTextEntry1] : 74 yof with heavy smoking history over 50 packs a year, current smoker, diagnosed with metastatic SCLCa by IR guided biopsy of a lung nodule on 6/3/2019. \par \par CT c/a/p 7/3/2019: New near complete collapse of the right lower lobe secondary to obstruction of the right lower lobe bronchus. Heterogeneous enhancement of the right lower lung may be related to mucoid impacted airways and/or spread of tumor. Additional pulmonary nodules/masses are without significant change.Indeterminant hypoattenuating lesions within the spleen.Indeterminate bilateral adrenal nodules.\par Nonspecific hyperenhancement involving the endometrial cavity of uncertain etiology; consider correlation with pelvic sonogram.\par \par MRI Brain 7/3/2019: Motion limited study demonstrating moderate to marked volume loss and white matter T2 hyperintensities likely microvascular disease, without evidence for any restricted diffusion, emorrhage, or midline shift.Loss of T1 marrow signal in clivus, skull base, and calvaria with hyperintense ADC signal, with known small cell carcinoma, osseous metastasis are not excluded, improved assessment via nuclear medicine bone scan or repeat PET/CT may be obtained as clinically warranted.\par \par PET: 4/24/2019:multiple lung nodules on the left and 1 nodule on the right, concerning for metastatic lung cancer ( no official read available)\par  \par Patient was referred for  possible bronchoscopy to evaluate RLL bronchus collapse ( tumor vs mucous plug).\par Patient denies respiratory symptoms, other than non-productive cough. She stills smokes and uses no inhalers. She reports feeling some sputum being stuck in her check when she tries to cough it up.\par She continues to be losing weight, has poor appetite, but never been good eater.\par \par \par \par \par

## 2019-08-26 PROBLEM — F41.9 ANXIETY: Status: ACTIVE | Noted: 2019-01-01

## 2019-08-26 PROBLEM — Z71.89 ADVANCE CARE PLANNING: Status: ACTIVE | Noted: 2019-01-01

## 2019-08-26 NOTE — ASSESSMENT
[______] : HCP: [unfilled] [Completed Medical Orders for Life-Sustaining Treatment] : completed medical orders for life-sustaining treatment [DNR] : Code Status: DNR [Limited] : Treatment Guidelines: Limited [DNI] : Intubation: DNI [FreeTextEntry1] : 74yoF with:\par \par 1. Small Cell lung cancer - s/p first cycle of keytruda.Follow up with Med Onc.\par \par 2. Loss of appetite - Patient is a candidate for medical cannabis and may benefit greatly.  However, both daughter and patient prefer to hold off on medical cannabis.   Start mirtazapine 15mg QHS.\par \par 3. Fatigue - Advised patient to stay active as much as tolerated, take short, scheduled naps to restore energy.  May benefit from stimulant if fatigue becomes progressively worse.  Will continue to monitor closely. \par \par 4.. Weakness - Completed PT last month without significant improvement.  Encouraged to stay as active as tolerated.\par \par 5. Anxiety - Mirtazapine to be started.  May also benefit from hemp-derived CBD. \par \par 6. Encounter for Palliative Care - HCP already in place.  MOLST form completed in office today - DNR/DNI.  Greater than 16 minutes spent discussing ACP. \par \par RTO in 1 month.  Advised to call should any questions or issues arise.

## 2019-08-26 NOTE — PHYSICAL EXAM
[General Appearance - Alert] : alert [Sclera] : the sclera and conjunctiva were normal [Normal Oral Mucosa] : normal oral mucosa [Oriented To Time, Place, And Person] : oriented to person, place, and time [Affect] : the affect was normal [Respiration, Rhythm And Depth] : normal respiratory rhythm and effort [] : no respiratory distress [Heart Sounds] : normal S1 and S2 [Heart Rate And Rhythm] : heart rate was normal and rhythm regular [Edema] : there was no peripheral edema [Abdomen Soft] : soft [Bowel Sounds] : normal bowel sounds [Skin Color & Pigmentation] : normal skin color and pigmentation [No Focal Deficits] : no focal deficits [FreeTextEntry1] : H

## 2019-08-26 NOTE — HISTORY OF PRESENT ILLNESS
[FreeTextEntry1] : 74 F with SCLC presents for initial palliative care visit, referred by Dr. Doss.   PMH significant for COPD with baseline sat of 91%, >50 pack year smoking history, ETOH misuse (4 glasses of wine per day), [place].   Patient currently smokes 2 packs per day (lifelong).  \par \par PET/CT 4/2019 demonstrated multiple lung nodules concerning for lung cancer.  IR guided biopsy of lung nodule (6/3/2019) confirmed SCLC. Patient being treated with Keytruda, is s/p 1st cycle. \par \par Patient reports occasional chest tightening. Was evaluated by pulmonary (Dr. Lua) earlier this week and was prescribed nebulizer treatments, has not yet started them. She was offered a bronchoscopy for evaluation of RLL collapse but both patient and daughter prefer less invasive approach.\par \par ROS:\par +weakness - completed PT last month without significant improvement. \par +fatigue \par +[place]\par +appetite loss - has little desire to eat lately, can go a full day without wanting to eat anything. Of note, reports she was never a "great eater"\par +weight loss of 9 lb in 6 months\par Denies trouble sleeping\par All other ROS as outlined or noncontributory. \par \par She does not use any assistive device for ambulating, daughter says that patient grabs a hold of the wall and furniture to get around her house, which is a very large space. 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   \par \par Patient is , lives with her . Her  is unwell with multiple medical problems.  She has three children who are involved.  Receives meals twice weekly from [employer]. \par \par ISTOP Ref #: [id number]

## 2019-08-26 NOTE — DATA REVIEWED
[FreeTextEntry1] : \par CHEST: \par \par LUNGS AND LARGE AIRWAYS: Central tumor/adenopathy measures 6.7 x 2.8 cm (series 3 image 127), difficult to compare to the prior exam given lack of intravenous contrast.There is new near complete collapse of the right lower lobe with obstruction of the right lower lobe bronchus and partial \par obstruction of the right middle lobe bronchus. Heterogeneous enhancement of the right lower lobe may be related to mucoid impaction and/or spread of tumor. \par A Right upper lobe nodule measures 0.9 x 0.9 cm (3:61), previously 0.8 x 0.9 cm. A 3 cm part solid mass in the posterior segment of the right upper lobe (3, 133), is without significant change. Additional scattered bilateral \par subcentimeter nodules are without significant change. \par PLEURA: No pleural effusion or pneumothorax. \par VESSELS: Severe atherosclerosis. Normal caliber aorta and main pulmonary artery. Attenuation of the right middle and lower lobe pulmonary arteries secondary to central mass. Addition, attenuation of the right inferior pulmonary vein secondary to mass. \par HEART: Heart size is normal. No pericardial effusion. Coronary artery calcifications. \par MEDIASTINUM AND JESSICA: Mediastinal and right hilar lymphadenopathy is largely unchanged with representative lymph nodes as follows. Unchanged 2.2 x 2.2 cm right hilar lymph node (3, 164), previously 1.9 x 2.4 cm. Unchanged 2.2 x 2.1 cm right lower paratracheal lymph node (3, 103), previously 2.0 x 2.2 cm. \par CHEST WALL AND LOWER NECK: No supraclavicular lymphadenopathy. \par \par ABDOMEN AND PELVIS: \par \par LIVER: Within normal limits. \par BILE DUCTS: Normal caliber. \par GALLBLADDER: Cholelithiasis. \par SPLEEN: Multiple indeterminate hypodense lesions a reference lesion measuring 1.1 cm (series 2 image 66). \par PANCREAS: Within normal limits. \par ADRENALS: A 0.8 cm left adrenal nodule (series 2 image 67) and a 1.2 cm adrenal nodule (series 2 image 68) are indeterminate. \par KIDNEYS/URETERS: Within normal limits. \par \par BLADDER: Within normal limits. \par REPRODUCTIVE ORGANS: Hyperattenuating endometrium of unclear etiology. No \par adnexal masses. \par \par BOWEL: No bowel obstruction. Nodular thickening of the tip of the appendix, \par measuring 0.9 cm, unchanged. \par PERITONEUM: No ascites. \par VESSELS: Severe atherosclerosis of the aorta and its branches. \par RETROPERITONEUM: No lymphadenopathy. \par ABDOMINAL WALL: Within normal limits. \par BONES: Degenerative changes. Old fracture deformity of the lateral aspect of the 5th right rib. Left acetabular bone island. \par \par IMPRESSION: \par \par New near complete collapse of the right lower lobe secondary to obstruction of the right lower lobe bronchus. Heterogeneous enhancement of the right lower lung may be related to mucoid impacted airways and/or spread of tumor. \par Additional pulmonary nodules/masses are without significant change. \par \par Indeterminant hypoattenuating lesions within the spleen. \par \par Indeterminate bilateral adrenal nodules. \par \par Nonspecific hyperenhancement involving the endometrial cavity of uncertain etiology; consider correlation with pelvic sonogram.

## 2019-09-05 PROBLEM — R53.83 FATIGUE: Status: ACTIVE | Noted: 2019-01-01

## 2019-09-05 PROBLEM — R68.89 FORGETFULNESS: Status: ACTIVE | Noted: 2019-01-01

## 2019-09-05 PROBLEM — R53.1 WEAKNESS: Status: ACTIVE | Noted: 2019-01-01

## 2019-09-05 NOTE — ASSESSMENT
[FreeTextEntry1] : 74f with extensive stage SCLC, presenting for follow up.\par Patient is not a good candidate for chemotherapy. Immunotherapy which is the second+ line of therapy was elected.\par She tolerated C1 and C2 well and is presenting prior to C3. \par \par -C3 pembrolizumab 9/6\par -Labs today\par -Restaging studies CT c/a/p and brain MRI \par -Will refer to neuro-onc for eval of forgetfulness \par -F/u Pal care and Pulm consult\par -RTC prior to C4\par

## 2019-09-05 NOTE — REVIEW OF SYSTEMS
[Fatigue] : fatigue [Cough] : cough [SOB on Exertion] : shortness of breath during exertion [Negative] : Allergic/Immunologic

## 2019-09-05 NOTE — HISTORY OF PRESENT ILLNESS
[Disease: _____________________] : Disease: [unfilled] [de-identified] : 74f, active smoker with a >50 pack year smoking history, EtOH dependent, drinks 4 glasses of wine daily, hard of hearing, with COPD, baseline O2 sat at rest 91%, who was found to have multiple lung nodules on the left and 1 nodule on the right, concerning for metastatic lung cancer on PET/CT 4/2019, s/p IR guided biopsy of a lung nodule on 6/3/2019, showing SCLC. \par \par CT c/a/p 7/3/2019:  New near complete collapse of the right lower lobe secondary to \par obstruction of the right lower lobe bronchus. Heterogeneous enhancement \par of the right lower lung may be related to mucoid impacted airways and/or \par spread of tumor. Additional pulmonary nodules/masses are without \par significant change.\par Indeterminant hypoattenuating lesions within the spleen.\par Indeterminate bilateral adrenal nodules.\par Nonspecific hyperenhancement involving the endometrial cavity of \par uncertain etiology; consider correlation with pelvic sonogram.\par \par MRI Brain 7/3/2019: Motion limited study demonstrating moderate to marked volume loss and \par white matter T2 hyperintensities likely microvascular disease, without \par evidence for any restricted diffusion, hemorrhage, or midline shift.\par Loss of T1 marrow signal in clivus, skull base, and calvaria with \par hyperintense ADC signal, with known small cell carcinoma, osseous \par metastasis are not excluded, improved assessment via nuclear medicine \par bone scan or repeat PET/CT  may be obtained as clinically warranted.\par  \par She denies pain. She notes that she has been losing weight and has felt increasingly weak in the past few months. Her appetite is poor and she has decreased PO intake, often times only drinking tea/coffee and toast during the day. She has an unstable gait and needs help with performing ADLs. She lives with her  who is also unwell with medical problems.  [de-identified] : Today Ms Farrell presents for visit prior to C3 Pembrolizumab. \par Her daughter accompanies her. Her daughter reports that Ms Farrell has been a bit more forgetful in the past couple of weeks. She occasionally forgets who the guests at her house are. On one occasion she told her daughter that she went to see a doctor the day before but this was not true. Daughter reports that the family has noticed that the patient has been getting more forgetful in the past 1-2 years, but seems like this is worsening. \par Her breathing is improving. Appetite is not great, she eats little. Weight is stable. \par Denies pain, headaches, is oriented x 3, fevers, focal weakness or sensory problems, visual changes. \par She spends her days sitting down and reading a book. She continues to smoke and drink.

## 2019-10-04 NOTE — ASSESSMENT
[FreeTextEntry1] : 74f with extensive stage SCLC, presenting for follow up.\par Patient is not a good candidate for chemotherapy. Immunotherapy which is the second+ line of therapy was elected.\par She tolerated C1-C3 well and is presenting prior to C4. \par Has worsening memory issues. Has not been to neuro.\par \par -C4 pembrolizumab 10/4\par -Labs today\par - B12, Folate, TSH, B1, syphilis screen to eval memory issue\par -Restaging studies CT c/a/p and brain MRI ordered\par -Will refer to neuro/neuro-onc for eval of forgetfulness - pt missed prior appointment. \par -F/u Pal care and Pulm consult\par -RTC prior to C5 after the above w/u\par \par Verona Doss MD\par Medical Oncology and Hematology\par \par

## 2019-10-04 NOTE — HISTORY OF PRESENT ILLNESS
[Disease: _____________________] : Disease: [unfilled] [de-identified] : 74f, active smoker with a >50 pack year smoking history, EtOH dependent, drinks 4 glasses of wine daily, hard of hearing, with COPD, baseline O2 sat at rest 91%, who was found to have multiple lung nodules on the left and 1 nodule on the right, concerning for metastatic lung cancer on PET/CT 4/2019, s/p IR guided biopsy of a lung nodule on 6/3/2019, showing SCLC. \par \par CT c/a/p 7/3/2019:  New near complete collapse of the right lower lobe secondary to \par obstruction of the right lower lobe bronchus. Heterogeneous enhancement \par of the right lower lung may be related to mucoid impacted airways and/or \par spread of tumor. Additional pulmonary nodules/masses are without \par significant change.\par Indeterminant hypoattenuating lesions within the spleen.\par Indeterminate bilateral adrenal nodules.\par Nonspecific hyperenhancement involving the endometrial cavity of \par uncertain etiology; consider correlation with pelvic sonogram.\par \par MRI Brain 7/3/2019: Motion limited study demonstrating moderate to marked volume loss and \par white matter T2 hyperintensities likely microvascular disease, without \par evidence for any restricted diffusion, hemorrhage, or midline shift.\par Loss of T1 marrow signal in clivus, skull base, and calvaria with \par hyperintense ADC signal, with known small cell carcinoma, osseous \par metastasis are not excluded, improved assessment via nuclear medicine \par bone scan or repeat PET/CT  may be obtained as clinically warranted.\par  \par She denies pain. She notes that she has been losing weight and has felt increasingly weak in the past few months. Her appetite is poor and she has decreased PO intake, often times only drinking tea/coffee and toast during the day. She has an unstable gait and needs help with performing ADLs. She lives with her  who is also unwell with medical problems.  [de-identified] : Ms Farrell presents for follow up prior to C4 pembrolizumab. \par She has been able to tolerate immunotherapy well. Her breathing is improved. \par She has been having memory issues, this has been ongoing for 3-4 years, but seem to have deteriorated. \par Energy level and appetite is at baseline. \par No rash, diarrhea, cough.

## 2019-10-11 NOTE — HISTORY OF PRESENT ILLNESS
[FreeTextEntry1] : 74 yof with heavy smoking history over 50 packs a year, current smoker, diagnosed with metastatic SCLCa by IR guided biopsy of a lung nodule on 6/3/2019. \par \par CT c/a/p 7/3/2019: New near complete collapse of the right lower lobe secondary to obstruction of the right lower lobe bronchus. Heterogeneous enhancement of the right lower lung may be related to mucoid impacted airways and/or spread of tumor. Additional pulmonary nodules/masses are without significant change.Indeterminant hypoattenuating lesions within the spleen.Indeterminate bilateral adrenal nodules.\par Nonspecific hyperenhancement involving the endometrial cavity of uncertain etiology; consider correlation with pelvic sonogram.\par \par MRI Brain 7/3/2019: Motion limited study demonstrating moderate to marked volume loss and white matter T2 hyperintensities likely microvascular disease, without evidence for any restricted diffusion, hemorrhage, or midline shift.Loss of T1 marrow signal in clivus, skull base, and calvaria with hyperintense ADC signal, with known small cell carcinoma, osseous metastasis are not excluded, improved assessment via nuclear medicine bone scan or repeat PET/CT may be obtained as clinically warranted.\par \par PET: 4/24/2019:multiple lung nodules on the left and 1 nodule on the right, concerning for metastatic lung cancer ( no official read available)\par  \par Bronchoscopy and biopsy was deferred during last/initial visit. Patient is not interested in invasive procedures and utility of bronchoscopy was not clear considering she is not a good candidate for any advanced bronchoscopic treatment or chemo therapy.\par She was started on pembrolizumab completed C4. She is doing well from respiratory point of view, using nebulized albuterol, which helps her for 1-2 hrs, but then she again feels some dyspnea. She has cough, feels sputum inside, but is not able to cough it out. She got aerobika, but was not able to put it together with nebulizer.\par Her appetite is still poor, but not changed since baseline and her hina has been stable.  She reports some runny nose over last 2-3 days and chest congestion. She denies fevers. body aches. She has low grade temp in the office.\par She continues to smoke.\par

## 2019-10-11 NOTE — REVIEW OF SYSTEMS
[Fatigue] : fatigue [Cough] : cough [Postnasal Drip] : postnasal drip [Chest Tightness] : chest tightness [Immunocompromised] : immunocompromised disease [Memory Loss] : ~T memory lapses or loss [Negative] : Psychiatric [Fever] : no fever [Chills] : no chills [Poor Appetite] : normal appetite  [Recent Wt Gain (___ Lbs)] : no recent weight gain [Recent Wt Loss (___ Lbs)] : no recent weight loss [Sputum] : not coughing up ~M sputum [Hemoptysis] : no hemoptysis [Dyspnea] : no dyspnea [Pleuritic Pain] : no pleuritic pain [Wheezing] : no wheezing [Palpitations] : no palpitations [Edema] : ~T edema was not present

## 2019-10-11 NOTE — ASSESSMENT
[FreeTextEntry1] : 74 yof with heavy smoking history over 50 packs a year, current smoker, diagnosed with metastatic SCLCa by IR guided biopsy of a lung nodule on 6/3/2019. \par \par Bronchoscopy for evaluation of RLL collapse was discussed and deferred. She likes nebulized albuterol which will be continued. I told patient to increase it to TID if she feels she needs it. There is no point in starting long acting inhalers while she is still smoking.\par I explained again to daughter how to use aerobika, i believe she will benefit from it in regards of sputum clearance. Her RLL is still down based on auscultation, still not clear if it is due to mucous plug or ca.\par She is minimally symptomatic therefore i do not change anything at this time.\par OV in 1-2 month with dr. Hinds.

## 2019-10-11 NOTE — PHYSICAL EXAM
[Well Groomed] : well groomed [General Appearance - In No Acute Distress] : no acute distress [Normal Conjunctiva] : the conjunctiva exhibited no abnormalities [Erythema] : erythema of the pharynx [Neck Appearance] : the appearance of the neck was normal [Neck Cervical Mass (___cm)] : no neck mass was observed [Heart Rate And Rhythm] : heart rate and rhythm were normal [Heart Sounds] : normal S1 and S2 [] : no respiratory distress [Respiration, Rhythm And Depth] : normal respiratory rhythm and effort [Bowel Sounds] : normal bowel sounds [Abdomen Soft] : soft [Abdomen Tenderness] : non-tender [Gait - Sufficient For Exercise Testing] : the gait was sufficient for exercise testing [Nail Clubbing] : no clubbing of the fingernails [Cyanosis, Localized] : no localized cyanosis [No Focal Deficits] : no focal deficits [FreeTextEntry2] : no LE edema [FreeTextEntry1] : impaired memory

## 2019-11-13 PROBLEM — E53.8 LOW FOLATE: Status: ACTIVE | Noted: 2019-01-01

## 2019-11-13 NOTE — HISTORY OF PRESENT ILLNESS
[de-identified] : Brandi presents for follow up. She continues to smoke. Her memory continues to be poor. She was found to have a folate level of 2 on last visit and was started on multivitamins and folic acid tablets, she has been compliant with taking vitamins. She was referred to neurology but was waiting for brain MRI before making an appointment to see neurology. \par Energy level and appetite are stable, she has gained 2 kilos. She denies pain or SOB. [de-identified] : 74f, active smoker with a >50 pack year smoking history, EtOH dependent, drinks 4 glasses of wine daily, hard of hearing, with COPD, baseline O2 sat at rest 91%, who was found to have multiple lung nodules on the left and 1 nodule on the right, concerning for metastatic lung cancer on PET/CT 4/2019, s/p IR guided biopsy of a lung nodule on 6/3/2019, showing SCLC. \par \par CT c/a/p 7/3/2019:  New near complete collapse of the right lower lobe secondary to \par obstruction of the right lower lobe bronchus. Heterogeneous enhancement \par of the right lower lung may be related to mucoid impacted airways and/or \par spread of tumor. Additional pulmonary nodules/masses are without \par significant change.\par Indeterminant hypoattenuating lesions within the spleen.\par Indeterminate bilateral adrenal nodules.\par Nonspecific hyperenhancement involving the endometrial cavity of \par uncertain etiology; consider correlation with pelvic sonogram.\par \par MRI Brain 7/3/2019: Motion limited study demonstrating moderate to marked volume loss and \par white matter T2 hyperintensities likely microvascular disease, without \par evidence for any restricted diffusion, hemorrhage, or midline shift.\par Loss of T1 marrow signal in clivus, skull base, and calvaria with \par hyperintense ADC signal, with known small cell carcinoma, osseous \par metastasis are not excluded, improved assessment via nuclear medicine \par bone scan or repeat PET/CT  may be obtained as clinically warranted.\par \par Patient received 4 cycles of pembrolizumab and she tolerated treatment well. \par Her memory has been poor. \par \par She had follow up imaging which shows stable disease. \par CT c/a/p 11/10\par IMPRESSION: \par Mediastinal lymphadenopathy, slightly decreased since 7/3/2019. \par Again noted, is right lower lobe collapse. A 2.2 cm rounded enhancing lesion is seen within the \par collapsed right lower lobe. A semisolid right upper lobe mass is unchanged. A 0.7 cm right upper \par lobe nodule may be minimally decreased in size. \par Soft tissue density in the bronchus intermedius, which may represent debris and/or mass.\par Irregular heterogeneous appearance of the appendix, slightly increased in size since 7/3/2019. \par Underlying neoplasm cannot be excluded.\par \par Brain MRI 11/10\par Impression: Heterogeneous signal involving the osseous structures again seen as described above. [Disease: _____________________] : Disease: [unfilled]

## 2019-11-13 NOTE — PHYSICAL EXAM
[Ambulatory and capable of all self care but unable to carry out any work activities] : Status 2- Ambulatory and capable of all self care but unable to carry out any work activities. Up and about more than 50% of waking hours [Normal] : grossly intact [de-identified] : crackles and decreased breath sounds on the right llower lung

## 2019-11-13 NOTE — ASSESSMENT
[FreeTextEntry1] : 74f with extensive stage SCLC, presenting for follow up.\par Patient is not a good candidate for chemotherapy. Immunotherapy which is the second+ line of therapy was elected.\par She tolerated C1-C4 well, restaging scans with mildly improved/stable disease. Will continue with immunotherapy.\par Has worsening memory issues. Has not been to neuro.\par \par -Continue with C5 pembrolizumab \par -Labs today\par -Will also recheck Marjorie hartmann, has been on supplementation for 1.5 months. \par -Neurology referral for memory issues \par -F/u Pal care and Pulm \par -RTC prior to C6\par \par Verona Doss MD\par Medical Oncology and Hematology\par \par

## 2019-12-07 PROBLEM — F02.80 DEMENTIA ASSOCIATED WITH OTHER UNDERLYING DISEASE WITHOUT BEHAVIORAL DISTURBANCE: Status: ACTIVE | Noted: 2019-01-01

## 2019-12-07 PROBLEM — H91.93 HEARING LOSS, BILATERAL: Status: ACTIVE | Noted: 2019-01-01

## 2019-12-07 NOTE — PHYSICAL EXAM
[FreeTextEntry1] : Alert, friendly and cooperative. Disoriented to month and year ("November 1975")Short term recall 1/3 words after 3 minutes. No dyscalculia. Fund of information good; e.g knows name of vice-president "Stephen". She reads [employer]. VF full. DONG, EOMI. Smile symmetric. Profound hearing loss AU. Reads lips.Tongue protrudes in midline. Neck is supple. No pain with percussion of spine. No focal weakness or sensory loss. Gait is steady. No pathologic reflexes except for snout reflex.

## 2019-12-07 NOTE — REVIEW OF SYSTEMS
[Recent Weight Gain (___ Lbs)] : recent [unfilled] ~Ulb weight gain [As Noted in HPI] : as noted in HPI [Negative] : Eyes

## 2019-12-07 NOTE — HISTORY OF PRESENT ILLNESS
[FreeTextEntry1] : Hx from daughter and EHR. \par \par Briiefly, this 72 yr-old woman has a hx of impaired cognition with noticeable decline in last 7 months by family. She is profoundly deaf (has 9 siblings with hearing impairment). She is an alcoholic and now drinks 4 glasses of wine per day. She continues to smoke 1 PPD of cigarettes (60 pack-yr hx) and has been diagnosed with stage 4 SCLC. She has completed 4 cycles of Keytruda. MRI brain pre and post contrast in July and repeated last month shows no parenchymal metastases. Question of calvarial mets vs. demineralization suggested by radiologist and findings were unchanged.  B12 level was 288 in April 2018 and she has been receiving parenteral B-12 monthly and last month B12 level was 554.

## 2019-12-23 NOTE — HISTORY OF PRESENT ILLNESS
[Disease: _____________________] : Disease: [unfilled] [de-identified] : Patient is tolerating treatment well. She continues to have poor memory. Continues to smoke and drink. Performance status is at baseline. No new complaints.  \par She was seen by neurology.  [de-identified] : 74f, active smoker with a >50 pack year smoking history, EtOH dependent, drinks 4 glasses of wine daily, hard of hearing, with COPD, baseline O2 sat at rest 91%, who was found to have multiple lung nodules on the left and 1 nodule on the right, concerning for metastatic lung cancer on PET/CT 4/2019, s/p IR guided biopsy of a lung nodule on 6/3/2019, showing SCLC. \par \par CT c/a/p 7/3/2019:  New near complete collapse of the right lower lobe secondary to \par obstruction of the right lower lobe bronchus. Heterogeneous enhancement \par of the right lower lung may be related to mucoid impacted airways and/or \par spread of tumor. Additional pulmonary nodules/masses are without \par significant change.\par Indeterminant hypoattenuating lesions within the spleen.\par Indeterminate bilateral adrenal nodules.\par Nonspecific hyperenhancement involving the endometrial cavity of \par uncertain etiology; consider correlation with pelvic sonogram.\par \par MRI Brain 7/3/2019: Motion limited study demonstrating moderate to marked volume loss and \par white matter T2 hyperintensities likely microvascular disease, without \par evidence for any restricted diffusion, hemorrhage, or midline shift.\par Loss of T1 marrow signal in clivus, skull base, and calvaria with \par hyperintense ADC signal, with known small cell carcinoma, osseous \par metastasis are not excluded, improved assessment via nuclear medicine \par bone scan or repeat PET/CT  may be obtained as clinically warranted.\par \par Patient received 4 cycles of pembrolizumab and she tolerated treatment well. \par Her memory has been poor. \par \par She had follow up imaging which shows stable disease. \par CT c/a/p 11/10\par IMPRESSION: \par Mediastinal lymphadenopathy, slightly decreased since 7/3/2019. \par Again noted, is right lower lobe collapse. A 2.2 cm rounded enhancing lesion is seen within the \par collapsed right lower lobe. A semisolid right upper lobe mass is unchanged. A 0.7 cm right upper \par lobe nodule may be minimally decreased in size. \par Soft tissue density in the bronchus intermedius, which may represent debris and/or mass.\par Irregular heterogeneous appearance of the appendix, slightly increased in size since 7/3/2019. \par Underlying neoplasm cannot be excluded.\par \par Brain MRI 11/10\par Impression: Heterogeneous signal involving the osseous structures again seen as described above.

## 2019-12-23 NOTE — PHYSICAL EXAM
[Restricted in physically strenuous activity but ambulatory and able to carry out work of a light or sedentary nature] : Status 1- Restricted in physically strenuous activity but ambulatory and able to carry out work of a light or sedentary nature, e.g., light house work, office work [Normal] : affect appropriate [de-identified] : few crackels

## 2019-12-23 NOTE — ASSESSMENT
[FreeTextEntry1] : 74f with extensive stage SCLC, presenting for follow up.\par Patient is not a good candidate for chemotherapy. Immunotherapy which is the second+ line of therapy was elected.\par She tolerated C1-C4 well, restaging scans with mildly improved/stable disease. Will continue with immunotherapy.\par Has worsening memory issues. Has not been to neuro.\par \par -Continue with C6 pembrolizumab \par -Labs today\par -Neurology referral for memory issues \par -F/u Pal care and Pulm \par -RTC prior to C7\par \par Verona Doss MD\par Medical Oncology and Hematology\par \par

## 2020-01-01 ENCOUNTER — APPOINTMENT (OUTPATIENT)
Dept: HEMATOLOGY ONCOLOGY | Facility: CLINIC | Age: 75
End: 2020-01-01
Payer: MEDICARE

## 2020-01-01 ENCOUNTER — APPOINTMENT (OUTPATIENT)
Dept: HEMATOLOGY ONCOLOGY | Facility: CLINIC | Age: 75
End: 2020-01-01

## 2020-01-01 ENCOUNTER — RX RENEWAL (OUTPATIENT)
Age: 75
End: 2020-01-01

## 2020-01-01 ENCOUNTER — APPOINTMENT (OUTPATIENT)
Dept: INFUSION THERAPY | Facility: HOSPITAL | Age: 75
End: 2020-01-01

## 2020-01-01 ENCOUNTER — LABORATORY RESULT (OUTPATIENT)
Age: 75
End: 2020-01-01

## 2020-01-01 ENCOUNTER — APPOINTMENT (OUTPATIENT)
Dept: GASTROENTEROLOGY | Facility: CLINIC | Age: 75
End: 2020-01-01
Payer: MEDICARE

## 2020-01-01 ENCOUNTER — OUTPATIENT (OUTPATIENT)
Dept: OUTPATIENT SERVICES | Facility: HOSPITAL | Age: 75
LOS: 1 days | Discharge: HOME | End: 2020-01-01

## 2020-01-01 ENCOUNTER — OUTPATIENT (OUTPATIENT)
Dept: OUTPATIENT SERVICES | Facility: HOSPITAL | Age: 75
LOS: 1 days | End: 2020-01-01
Payer: MEDICARE

## 2020-01-01 ENCOUNTER — RECORD ABSTRACTING (OUTPATIENT)
Age: 75
End: 2020-01-01

## 2020-01-01 ENCOUNTER — APPOINTMENT (OUTPATIENT)
Dept: MRI IMAGING | Facility: CLINIC | Age: 75
End: 2020-01-01
Payer: MEDICARE

## 2020-01-01 ENCOUNTER — FORM ENCOUNTER (OUTPATIENT)
Age: 75
End: 2020-01-01

## 2020-01-01 ENCOUNTER — APPOINTMENT (OUTPATIENT)
Dept: INTERNAL MEDICINE | Facility: CLINIC | Age: 75
End: 2020-01-01

## 2020-01-01 ENCOUNTER — APPOINTMENT (OUTPATIENT)
Dept: CT IMAGING | Facility: IMAGING CENTER | Age: 75
End: 2020-01-01

## 2020-01-01 ENCOUNTER — APPOINTMENT (OUTPATIENT)
Dept: CT IMAGING | Facility: CLINIC | Age: 75
End: 2020-01-01
Payer: MEDICARE

## 2020-01-01 ENCOUNTER — RESULT REVIEW (OUTPATIENT)
Age: 75
End: 2020-01-01

## 2020-01-01 VITALS
BODY MASS INDEX: 21.41 KG/M2 | SYSTOLIC BLOOD PRESSURE: 111 MMHG | HEART RATE: 86 BPM | RESPIRATION RATE: 20 BRPM | OXYGEN SATURATION: 98 % | TEMPERATURE: 97.4 F | WEIGHT: 113.32 LBS | DIASTOLIC BLOOD PRESSURE: 67 MMHG

## 2020-01-01 VITALS
TEMPERATURE: 97.4 F | RESPIRATION RATE: 16 BRPM | OXYGEN SATURATION: 98 % | BODY MASS INDEX: 20.91 KG/M2 | DIASTOLIC BLOOD PRESSURE: 69 MMHG | WEIGHT: 110.67 LBS | HEART RATE: 70 BPM | SYSTOLIC BLOOD PRESSURE: 114 MMHG

## 2020-01-01 DIAGNOSIS — C34.90 MALIGNANT NEOPLASM OF UNSPECIFIED PART OF UNSPECIFIED BRONCHUS OR LUNG: ICD-10-CM

## 2020-01-01 DIAGNOSIS — R63.0 ANOREXIA: ICD-10-CM

## 2020-01-01 DIAGNOSIS — I10 ESSENTIAL (PRIMARY) HYPERTENSION: ICD-10-CM

## 2020-01-01 DIAGNOSIS — K70.30 ALCOHOLIC CIRRHOSIS OF LIVER W/OUT ASCITES: ICD-10-CM

## 2020-01-01 DIAGNOSIS — R10.30 LOWER ABDOMINAL PAIN, UNSPECIFIED: ICD-10-CM

## 2020-01-01 DIAGNOSIS — R06.09 OTHER FORMS OF DYSPNEA: ICD-10-CM

## 2020-01-01 DIAGNOSIS — Z51.5 ENCOUNTER FOR PALLIATIVE CARE: ICD-10-CM

## 2020-01-01 DIAGNOSIS — D72.829 ELEVATED WHITE BLOOD CELL COUNT, UNSPECIFIED: ICD-10-CM

## 2020-01-01 DIAGNOSIS — R53.81 OTHER MALAISE: ICD-10-CM

## 2020-01-01 DIAGNOSIS — Z51.11 ENCOUNTER FOR ANTINEOPLASTIC CHEMOTHERAPY: ICD-10-CM

## 2020-01-01 DIAGNOSIS — N39.0 URINARY TRACT INFECTION, SITE NOT SPECIFIED: ICD-10-CM

## 2020-01-01 DIAGNOSIS — Z80.0 FAMILY HISTORY OF MALIGNANT NEOPLASM OF DIGESTIVE ORGANS: ICD-10-CM

## 2020-01-01 DIAGNOSIS — R19.7 DIARRHEA, UNSPECIFIED: ICD-10-CM

## 2020-01-01 DIAGNOSIS — R74.8 ABNORMAL LEVELS OF OTHER SERUM ENZYMES: ICD-10-CM

## 2020-01-01 LAB
ALBUMIN SERPL ELPH-MCNC: 2.7 G/DL
ALBUMIN SERPL ELPH-MCNC: 3.8 G/DL
ALP BLD-CCNC: 232 U/L
ALP BLD-CCNC: 83 U/L
ALT SERPL-CCNC: 19 U/L
ALT SERPL-CCNC: 54 U/L
ANION GAP SERPL CALC-SCNC: 17 MMOL/L
ANION GAP SERPL CALC-SCNC: 9 MMOL/L
APPEARANCE: ABNORMAL
AST SERPL-CCNC: 36 U/L
AST SERPL-CCNC: 59 U/L
BACTERIA STL CULT: NORMAL
BACTERIA UR CULT: ABNORMAL
BACTERIA: ABNORMAL
BASOPHILS # BLD AUTO: 0 K/UL — SIGNIFICANT CHANGE UP (ref 0–0.2)
BASOPHILS # BLD AUTO: 0.04 K/UL
BASOPHILS NFR BLD AUTO: 0.2 %
BASOPHILS NFR BLD AUTO: 0.5 % — SIGNIFICANT CHANGE UP (ref 0–2)
BILIRUB SERPL-MCNC: 0.4 MG/DL
BILIRUB SERPL-MCNC: 0.8 MG/DL
BILIRUBIN URINE: NEGATIVE
BLOOD URINE: NEGATIVE
BUN SERPL-MCNC: 10 MG/DL
BUN SERPL-MCNC: 11 MG/DL
C DIFF TOX GENS STL QL NAA+PROBE: NORMAL
C DIFF TOX GENS STL QL NAA+PROBE: NORMAL
CALCIUM SERPL-MCNC: 8 MG/DL
CALCIUM SERPL-MCNC: 8.7 MG/DL
CALPROTECTIN FECAL: 133 UG/G
CDIFF BY PCR: NOT DETECTED
CDIFF BY PCR: NOT DETECTED
CHLORIDE SERPL-SCNC: 102 MMOL/L
CHLORIDE SERPL-SCNC: 97 MMOL/L
CO2 SERPL-SCNC: 22 MMOL/L
CO2 SERPL-SCNC: 24 MMOL/L
COLOR: YELLOW
CREAT SERPL-MCNC: 0.68 MG/DL
CREAT SERPL-MCNC: 0.69 MG/DL
DEPRECATED O AND P PREP STL: NORMAL
EOSINOPHIL # BLD AUTO: 0.01 K/UL
EOSINOPHIL # BLD AUTO: 0.3 K/UL — SIGNIFICANT CHANGE UP (ref 0–0.5)
EOSINOPHIL NFR BLD AUTO: 0.1 %
EOSINOPHIL NFR BLD AUTO: 3.7 % — SIGNIFICANT CHANGE UP (ref 0–6)
FOLATE SERPL-MCNC: 11.2 NG/ML
GI PCR PANEL, STOOL: NORMAL
GLUCOSE QUALITATIVE U: NEGATIVE
GLUCOSE SERPL-MCNC: 114 MG/DL
GLUCOSE SERPL-MCNC: 149 MG/DL
HCT VFR BLD CALC: 38.6 %
HCT VFR BLD CALC: 39.4 % — SIGNIFICANT CHANGE UP (ref 34.5–45)
HGB BLD-MCNC: 12.2 G/DL
HGB BLD-MCNC: 13.4 G/DL — SIGNIFICANT CHANGE UP (ref 11.5–15.5)
HYALINE CASTS: 0 /LPF
IMM GRANULOCYTES NFR BLD AUTO: 0.7 %
KETONES URINE: NEGATIVE
LEUKOCYTE ESTERASE URINE: ABNORMAL
LYMPHOCYTES # BLD AUTO: 1.3 K/UL — SIGNIFICANT CHANGE UP (ref 1–3.3)
LYMPHOCYTES # BLD AUTO: 15.6 % — SIGNIFICANT CHANGE UP (ref 13–44)
LYMPHOCYTES # BLD AUTO: 2.45 K/UL
LYMPHOCYTES NFR BLD AUTO: 15 %
MAGNESIUM SERPL-MCNC: 1.7 MG/DL
MAN DIFF?: NORMAL
MCHC RBC-ENTMCNC: 31.6 GM/DL
MCHC RBC-ENTMCNC: 33.2 PG
MCHC RBC-ENTMCNC: 34 G/DL — SIGNIFICANT CHANGE UP (ref 32–36)
MCHC RBC-ENTMCNC: 34.7 PG — HIGH (ref 27–34)
MCV RBC AUTO: 102 FL — HIGH (ref 80–100)
MCV RBC AUTO: 104.9 FL
MICROSCOPIC-UA: NORMAL
MONOCYTES # BLD AUTO: 0.8 K/UL — SIGNIFICANT CHANGE UP (ref 0–0.9)
MONOCYTES # BLD AUTO: 1.56 K/UL
MONOCYTES NFR BLD AUTO: 10.2 % — SIGNIFICANT CHANGE UP (ref 2–14)
MONOCYTES NFR BLD AUTO: 9.6 %
NEUTROPHILS # BLD AUTO: 12.11 K/UL
NEUTROPHILS # BLD AUTO: 5.7 K/UL — SIGNIFICANT CHANGE UP (ref 1.8–7.4)
NEUTROPHILS NFR BLD AUTO: 70 % — SIGNIFICANT CHANGE UP (ref 43–77)
NEUTROPHILS NFR BLD AUTO: 74.4 %
NITRITE URINE: POSITIVE
PH URINE: 6
PLATELET # BLD AUTO: 156 K/UL
PLATELET # BLD AUTO: 199 K/UL — SIGNIFICANT CHANGE UP (ref 150–400)
POTASSIUM SERPL-SCNC: 3.8 MMOL/L
POTASSIUM SERPL-SCNC: 4 MMOL/L
PROT SERPL-MCNC: 4.7 G/DL
PROT SERPL-MCNC: 6.1 G/DL
PROTEIN URINE: NORMAL
RBC # BLD: 3.68 M/UL
RBC # BLD: 3.86 M/UL — SIGNIFICANT CHANGE UP (ref 3.8–5.2)
RBC # FLD: 12.2 % — SIGNIFICANT CHANGE UP (ref 10.3–14.5)
RBC # FLD: 14.8 %
RED BLOOD CELLS URINE: 3 /HPF
SODIUM SERPL-SCNC: 135 MMOL/L
SODIUM SERPL-SCNC: 135 MMOL/L
SPECIFIC GRAVITY URINE: 1.01
SQUAMOUS EPITHELIAL CELLS: 17 /HPF
TSH SERPL-ACNC: 2.45 UIU/ML
TSH SERPL-ACNC: 2.5 UIU/ML
UROBILINOGEN URINE: NORMAL
VIT B12 SERPL-MCNC: 775 PG/ML
WBC # BLD: 8.1 K/UL — SIGNIFICANT CHANGE UP (ref 3.8–10.5)
WBC # FLD AUTO: 16.29 K/UL
WBC # FLD AUTO: 8.1 K/UL — SIGNIFICANT CHANGE UP (ref 3.8–10.5)
WHITE BLOOD CELLS URINE: 58 /HPF

## 2020-01-01 PROCEDURE — 74177 CT ABD & PELVIS W/CONTRAST: CPT | Mod: 26

## 2020-01-01 PROCEDURE — 99214 OFFICE O/P EST MOD 30 MIN: CPT | Mod: 95

## 2020-01-01 PROCEDURE — 71260 CT THORAX DX C+: CPT | Mod: 26

## 2020-01-01 PROCEDURE — 70553 MRI BRAIN STEM W/O & W/DYE: CPT

## 2020-01-01 PROCEDURE — 70553 MRI BRAIN STEM W/O & W/DYE: CPT | Mod: 26

## 2020-01-01 PROCEDURE — A9585: CPT

## 2020-01-01 PROCEDURE — 74177 CT ABD & PELVIS W/CONTRAST: CPT

## 2020-01-01 PROCEDURE — 99214 OFFICE O/P EST MOD 30 MIN: CPT

## 2020-01-01 PROCEDURE — 99204 OFFICE O/P NEW MOD 45 MIN: CPT | Mod: 95

## 2020-01-01 PROCEDURE — 71260 CT THORAX DX C+: CPT

## 2020-01-01 PROCEDURE — 99215 OFFICE O/P EST HI 40 MIN: CPT

## 2020-01-01 PROCEDURE — 99442: CPT

## 2020-01-01 RX ORDER — MIRTAZAPINE 15 MG/1
15 TABLET, FILM COATED ORAL
Qty: 30 | Refills: 3 | Status: DISCONTINUED | COMMUNITY
Start: 2019-01-01 | End: 2020-01-01

## 2020-01-01 RX ORDER — CHROMIUM 200 MCG
TABLET ORAL
Refills: 0 | Status: ACTIVE | COMMUNITY

## 2020-01-01 RX ORDER — FLUTICASONE PROPIONATE 50 UG/1
50 SPRAY, METERED NASAL TWICE DAILY
Qty: 1 | Refills: 1 | Status: DISCONTINUED | COMMUNITY
Start: 2019-01-31 | End: 2020-01-01

## 2020-01-01 RX ORDER — DIPHENOXYLATE HYDROCHLORIDE AND ATROPINE SULFATE 2.5; .025 MG/1; MG/1
2.5-0.025 TABLET ORAL 4 TIMES DAILY
Qty: 12 | Refills: 0 | Status: ACTIVE | COMMUNITY
Start: 2020-01-01 | End: 1900-01-01

## 2020-01-01 RX ORDER — CIPROFLOXACIN HYDROCHLORIDE 500 MG/1
500 TABLET, FILM COATED ORAL
Qty: 14 | Refills: 0 | Status: ACTIVE | COMMUNITY
Start: 2020-01-01 | End: 1900-01-01

## 2020-01-17 NOTE — HISTORY OF PRESENT ILLNESS
[Disease: _____________________] : Disease: [unfilled] [de-identified] : 74f, active smoker with a >50 pack year smoking history, EtOH dependent, drinks 4 glasses of wine daily, hard of hearing, with COPD, baseline O2 sat at rest 91%, who was found to have multiple lung nodules on the left and 1 nodule on the right, concerning for metastatic lung cancer on PET/CT 4/2019, s/p IR guided biopsy of a lung nodule on 6/3/2019, showing SCLC. \par \par CT c/a/p 7/3/2019:  New near complete collapse of the right lower lobe secondary to \par obstruction of the right lower lobe bronchus. Heterogeneous enhancement \par of the right lower lung may be related to mucoid impacted airways and/or \par spread of tumor. Additional pulmonary nodules/masses are without \par significant change.\par Indeterminant hypoattenuating lesions within the spleen.\par Indeterminate bilateral adrenal nodules.\par Nonspecific hyperenhancement involving the endometrial cavity of \par uncertain etiology; consider correlation with pelvic sonogram.\par \par MRI Brain 7/3/2019: Motion limited study demonstrating moderate to marked volume loss and \par white matter T2 hyperintensities likely microvascular disease, without \par evidence for any restricted diffusion, hemorrhage, or midline shift.\par Loss of T1 marrow signal in clivus, skull base, and calvaria with \par hyperintense ADC signal, with known small cell carcinoma, osseous \par metastasis are not excluded, improved assessment via nuclear medicine \par bone scan or repeat PET/CT  may be obtained as clinically warranted.\par \par Patient received 4 cycles of pembrolizumab and she tolerated treatment well. \par Her memory has been poor. \par \par She had follow up imaging which shows stable disease. \par CT c/a/p 11/10\par IMPRESSION: \par Mediastinal lymphadenopathy, slightly decreased since 7/3/2019. \par Again noted, is right lower lobe collapse. A 2.2 cm rounded enhancing lesion is seen within the \par collapsed right lower lobe. A semisolid right upper lobe mass is unchanged. A 0.7 cm right upper \par lobe nodule may be minimally decreased in size. \par Soft tissue density in the bronchus intermedius, which may represent debris and/or mass.\par Irregular heterogeneous appearance of the appendix, slightly increased in size since 7/3/2019. \par Underlying neoplasm cannot be excluded.\par \par Brain MRI 11/10\par Impression: Heterogeneous signal involving the osseous structures again seen as described above. [de-identified] : Ms Farrell presents with her daughter today. \par She is at baseline, stable appetite and weight, declining memory. Continues to smoke and drink. \par No new complaints.

## 2020-01-17 NOTE — PHYSICAL EXAM
[Ambulatory and capable of all self care but unable to carry out any work activities] : Status 2- Ambulatory and capable of all self care but unable to carry out any work activities. Up and about more than 50% of waking hours [de-identified] : few crackles [Normal] : affect appropriate

## 2020-01-17 NOTE — ASSESSMENT
[FreeTextEntry1] : 74f with extensive stage SCLC, presenting for follow up.\par Patient is not a good candidate for chemotherapy. Immunotherapy which is the second+ line of therapy was elected.\par She tolerated C1-C4 well, restaging scans with mildly improved/stable disease. Will continue with immunotherapy as long as there is no progression of disease and patient is tolerating treatment.\par \par -Repeat imaging early february\par -forms for HHA filled\par -paper prescription for chucks and pull ups given\par -Continue with C6 pembrolizumab \par -Labs today\par -Neurology follow up for memory loss \par -F/u Pal care and Pulm \par -RTC prior to C7\par \par Verona Doss MD\par Medical Oncology and Hematology\par \par

## 2020-02-23 NOTE — PHYSICAL EXAM
[Ambulatory and capable of all self care but unable to carry out any work activities] : Status 2- Ambulatory and capable of all self care but unable to carry out any work activities. Up and about more than 50% of waking hours [Normal] : affect appropriate [de-identified] : few crackles

## 2020-02-23 NOTE — HISTORY OF PRESENT ILLNESS
[Disease: _____________________] : Disease: [unfilled] [de-identified] : 74f, active smoker with a >50 pack year smoking history, EtOH dependent, drinks 4 glasses of wine daily, hard of hearing, with COPD, baseline O2 sat at rest 91%, who was found to have multiple lung nodules on the left and 1 nodule on the right, concerning for metastatic lung cancer on PET/CT 4/2019, s/p IR guided biopsy of a lung nodule on 6/3/2019, showing SCLC. \par \par CT c/a/p 7/3/2019:  New near complete collapse of the right lower lobe secondary to \par obstruction of the right lower lobe bronchus. Heterogeneous enhancement \par of the right lower lung may be related to mucoid impacted airways and/or \par spread of tumor. Additional pulmonary nodules/masses are without \par significant change.\par Indeterminant hypoattenuating lesions within the spleen.\par Indeterminate bilateral adrenal nodules.\par Nonspecific hyperenhancement involving the endometrial cavity of \par uncertain etiology; consider correlation with pelvic sonogram.\par \par MRI Brain 7/3/2019: Motion limited study demonstrating moderate to marked volume loss and \par white matter T2 hyperintensities likely microvascular disease, without \par evidence for any restricted diffusion, hemorrhage, or midline shift.\par Loss of T1 marrow signal in clivus, skull base, and calvaria with \par hyperintense ADC signal, with known small cell carcinoma, osseous \par metastasis are not excluded, improved assessment via nuclear medicine \par bone scan or repeat PET/CT  may be obtained as clinically warranted.\par \par Patient received 4 cycles of pembrolizumab and she tolerated treatment well. \par Her memory has been poor. \par \par She had follow up imaging which shows stable disease. \par CT c/a/p 11/10\par IMPRESSION: \par Mediastinal lymphadenopathy, slightly decreased since 7/3/2019. \par Again noted, is right lower lobe collapse. A 2.2 cm rounded enhancing lesion is seen within the \par collapsed right lower lobe. A semisolid right upper lobe mass is unchanged. A 0.7 cm right upper \par lobe nodule may be minimally decreased in size. \par Soft tissue density in the bronchus intermedius, which may represent debris and/or mass.\par Irregular heterogeneous appearance of the appendix, slightly increased in size since 7/3/2019. \par Underlying neoplasm cannot be excluded.\par \par Brain MRI 11/10\par Impression: Heterogeneous signal involving the osseous structures again seen as described above. [de-identified] : Ms Farrell presents for follow up. \par She has experienced diarrhea and did not come for her MD appointment last week, however history is not clear 2/2 her dementia. Her daughter Aura is with her and she has tried to clarify the history and obtain collateral information from her father. She thinks that Ms Farrell experienced 2-3 days of diarrhea last week, no fevers, diarrhea was self limited. Reportedly, there hasn't been any diarrhea since last week. No sick contacts or suspicious foods. She is feeling at baseline now. Her appetite and energy level is stable. She denies pain. \par She continues to smoke and drink.

## 2020-02-23 NOTE — ASSESSMENT
[FreeTextEntry1] : 74f with extensive stage SCLC, presenting for follow up.\par Patient is not a good candidate for chemotherapy. Immunotherapy which is the second+ line of therapy was elected.\par She tolerated C1-C4 well, restaging scans with mildly improved/stable disease. Will continue with immunotherapy as long as there is no progression of disease and patient is tolerating treatment.\par \par Had diarrhea last week, history not reliable 2/2 dementia, but probably had diarrhea for 2-3 days. Self Limited, No fever. No diarrhea since then. I discussed with Mrs Farrell and her daughter that diarrhea can be a side effect of immunotherapy and that is my major concern. Shared decision making is to continue with immunotherapy given the self limited course of diarrhea. Patient and her daughter understand that in case of diarrhea, they should contact our office and possibly present to the emergency room for further work up. \par \par -Labs today\par -CT c/a/p with contrast and MRI brain with and without contrast for restaging ordered.\par -Continue with C7 pembrolizumab \par -In case of any diarrhea, contact office right away\par -Neurology follow up for memory loss \par -F/u Pal care and Pulm \par -RTC prior to C8 and after imaging is obtained\par \par Verona Doss MD\par Medical Oncology and Hematology\par \par

## 2020-04-16 NOTE — PHYSICAL EXAM
[Ambulatory and capable of all self care but unable to carry out any work activities] : Status 2- Ambulatory and capable of all self care but unable to carry out any work activities. Up and about more than 50% of waking hours [Thin] : thin

## 2020-04-16 NOTE — ASSESSMENT
[FreeTextEntry1] : 74f with extensive stage SCLC, presenting for follow up.\par Patient is not a good candidate for chemotherapy. Immunotherapy which is the second+ line of therapy was elected.\par She tolerated C1-C4 well, restaging scans with mildly improved/stable disease. She continued keytruda and repeat imaging after C8 showed slight progression of disease. We discussed different options including changing treatment to chemotherapy, which is usually poorly tolerated and is not what the patient and her family want. Continuing with immunotherapy, with either double agents or a different agent in hopes that it is slowing the progression, or holding off treatment and reassess in 6-8 weeks. Systemic treatment on hold for now. \par Last Keytruda infusion was given on January 29, 2020. \par \par Pt started to experience diarrhea in February. Has declined evaluation although offered multiple times. \par Today she continues to report Diarrhea once every 2-3 days, in the morning, followed by 2-3 episodes of abdominal cramping in the lower abdomen without diarrhea throughout the day. \par \par -Lab fly, check CBC, CM, Mg, TSH, B12 and folate\par -Pal care consult for pain management\par -GI referral\par -Neurology follow up for memory loss\par -RTC 6 weeks\par \par Verona Doss MD\par Medical Oncology and Hematology\par \par

## 2020-04-16 NOTE — HISTORY OF PRESENT ILLNESS
[Home] : at home, [unfilled] , at the time of the visit. [Other Location: e.g. Home (Enter Location, City,State)___] : at [unfilled] [Spouse] : spouse [Family Member] : family member [Patient] : the patient [Self] : self [Disease: _____________________] : Disease: [unfilled] [FreeTextEntry4] : Aura [de-identified] : Patient and HCP granted permission for tele health visit. \par 75f, active smoker with a >50 pack year smoking history, EtOH dependent, drinks 4 glasses of wine daily, hard of hearing, with COPD, baseline O2 sat at rest 91%, who was found to have multiple lung nodules on the left and 1 nodule on the right, concerning for metastatic lung cancer on PET/CT 4/2019, s/p IR guided biopsy of a lung nodule on 6/3/2019, showing SCLC. \par \par CT c/a/p 7/3/2019:  New near complete collapse of the right lower lobe secondary to \par obstruction of the right lower lobe bronchus. Heterogeneous enhancement \par of the right lower lung may be related to mucoid impacted airways and/or \par spread of tumor. Additional pulmonary nodules/masses are without \par significant change.\par Indeterminant hypoattenuating lesions within the spleen.\par Indeterminate bilateral adrenal nodules.\par Nonspecific hyperenhancement involving the endometrial cavity of \par uncertain etiology; consider correlation with pelvic sonogram.\par \par MRI Brain 7/3/2019: Motion limited study demonstrating moderate to marked volume loss and \par white matter T2 hyperintensities likely microvascular disease, without \par evidence for any restricted diffusion, hemorrhage, or midline shift.\par Loss of T1 marrow signal in clivus, skull base, and calvaria with \par hyperintense ADC signal, with known small cell carcinoma, osseous \par metastasis are not excluded, improved assessment via nuclear medicine \par bone scan or repeat PET/CT  may be obtained as clinically warranted.\par \par Patient received 4 cycles of pembrolizumab and she tolerated treatment well. \par Her memory has been poor. \par \par She had follow up imaging which shows stable disease. \par CT c/a/p 11/10\par IMPRESSION: \par Mediastinal lymphadenopathy, slightly decreased since 7/3/2019. \par Again noted, is right lower lobe collapse. A 2.2 cm rounded enhancing lesion is seen within the \par collapsed right lower lobe. A semisolid right upper lobe mass is unchanged. A 0.7 cm right upper \par lobe nodule may be minimally decreased in size. \par Soft tissue density in the bronchus intermedius, which may represent debris and/or mass.\par Irregular heterogeneous appearance of the appendix, slightly increased in size since 7/3/2019. \par Underlying neoplasm cannot be excluded.\par \par Brain MRI 11/10\par Impression: Heterogeneous signal involving the osseous structures again seen as described above.\par \par \par Repeat CT c/a/p 3/2020 with progression of disease, brain MRI negative for mets. Plan is to observe off treatment (refer to chart note 3/13/2020) [de-identified] : Ms Farrell has been experiencing diarrhea and therefore a follow up visit was arranged. \par \par Patient's last immunotherapy infusion was January 29th, 2020. She has experienced intermittent diarrhea, since then. She was recommended to present to the ED for evaluation in February but declined. Her blood work was stable, C diff and stool culture and O&P was negative. She was prescribed lomitil. \par She describes the diarrhea as occurring once every 2-3 days, in the morning when she wakes up, and she experiences a few episodes of abdominal cramping throughout the day. Her appetite and energy level are stable, although poor at baseline. She denies fever. She denies other pain. \par Her  is worried that she may be dehydrated and is trying to force her to drink gatorade but she does not like it.

## 2020-04-20 PROBLEM — Z80.0 FAMILY HISTORY OF PANCREATIC CANCER: Status: ACTIVE | Noted: 2020-01-01

## 2020-04-20 PROBLEM — R63.0 POOR APPETITE: Status: ACTIVE | Noted: 2019-01-01

## 2020-04-20 PROBLEM — R74.8 ELEVATED LIVER ENZYMES: Status: ACTIVE | Noted: 2020-01-01

## 2020-04-20 PROBLEM — Z80.0 FAMILY HISTORY OF STOMACH CANCER: Status: ACTIVE | Noted: 2020-01-01

## 2020-04-20 PROBLEM — R10.30 LOWER ABDOMINAL PAIN: Status: ACTIVE | Noted: 2020-01-01

## 2020-04-20 PROBLEM — Z80.0 FAMILY HISTORY OF COLON CANCER: Status: ACTIVE | Noted: 2020-01-01

## 2020-04-20 NOTE — HISTORY OF PRESENT ILLNESS
[Home] : at home, [unfilled] , at the time of the visit. [Other Location: e.g. Home (Enter Location, City,State)___] : at [unfilled] [Patient] : the patient [Other:____] : [unfilled] [FreeTextEntry1] : The patient is a 75-year-old woman with small cell lung cancer and alcoholic cirrhosis.  She suffers from deafness and dementia.  The patient's 2 daughters were present with her and provided much of the history.  The patient has been treated for the lung cancer with Keytruda from July to the end of January.  The treatment has been poor was due to the coronavirus pandemic.  Since Thanksgiving, the patient has had diarrhea.  She currently has about 3 loose bowel movements a day.  It is unclear whether the diarrhea was as bad at first as she was saying it occurred only every 2 to 3 days.  The patient has been on Lomotil once a day over the last few days and did not have a bowel movement yesterday and had only one today.  The patient complains of urgency and lower abdominal pain/cramps.  She denies melena or bright red blood per rectum.  Yesterday, the patient had a temperature of 99.3 degrees and today the temperature is 99 degrees.  The patient has lost 14 pounds over the past year.  The patient's baseline dementia has worsened recently.\par \par The patient had a CT scan on March 3, 2020 which showed appendiceal dilatation and nodular thickening up to 1.2 cm which was unchanged from a CT scan done on November 10, 2019.  There was no evidence of colitis.  Stool tests including CNS, O&P, and C. difficile PCR were negative on March 30, 2020.  Blood work done on April 17, 2020 was significant for leukocytosis with a white blood cell count of 16.29 along with AST of 59 ALT of 54 and alkaline phosphatase of 232.  Of note, these labs were normal in February.  Patient has a history of alcoholism and continues to drink although now drinks less, about 2 to 3 glasses of wine a night.  The patient continues to smoke cigarettes.\par \par  The patient has not been admitted to the hospital in the past year and denies any cardiac issues.  The patient has never had a colonoscopy. [FreeTextEntry2] : and daughters

## 2020-04-20 NOTE — HISTORY OF PRESENT ILLNESS
[Home] : at home, [unfilled] , at the time of the visit. [Medical Office: ([facility])___] : at the medical office located in  [Spouse] : spouse [Other:____] : [unfilled] [Patient] : the patient [Self] : self [FreeTextEntry1] : 74 F with SCLC presents for follow up visit via TeleMedicine, last seen by me 8/2019.  PMH significant for COPD with baseline sat of 91%, >50 pack year smoking history, ETOH misuse (2-3 glasses of wine per day), [place].   Patient currently smokes 2 packs per day (lifelong).  \par \par Last treatment with Keytruda was 1/29/20. \par \par Lately a big issue for patient has been diarrhea that began around Nov/December 2019. She seems to have bouts with it about every other day, 1-2 episodes of water diarrhea on those days. Stool studies have not been revealing of an infectious cause to date.  Has not had any changes in her dietary habits lately.  Has fecal incontinence on occasion when she cannot get to bathroom in time, wears a diaper. The diarrhea is associated with abdominal cramping that resolves after the BM.  \par Denies any red or dark colored stools. \par Had been using Imodium which was helping, this was \par \par Eats the same diet as her usual, which is very poor not diverse.  Eats small amounts of food throughout the day.\par She drinks 7-10 cups of caffeinated tea daily, fruit juices, very little if any water. \par \par ROS:\par +cognitive impairment (per daughters)\par +weakness - completed PT last month without significant improvement. \par +fatigue \par +STRICKLAND - is limited in how much she can walk due to becoming winded.\par +appetite is low but she'll eat if food is presented to her. Had previously been started on mirtazapine, did not use it for long. Takes a mult-vitamin and folic acid daily\par +weight loss of 9 lb in 6 months\par Denies trouble sleeping, n/v\par All other ROS as outlined or noncontributory. \par \par Patient does not use an assistive device for ambulating, daughter says that patient grabs a hold of the wall and furniture to get around her house, which is a very large space. 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 No falls recently.\par \par Patient is , lives with her . Her  is unwell with multiple medical problems.  She has three children who are involved.  Receives meals twice weekly from [employer]. \par \par I-STOP Ref #: [id number]

## 2020-04-20 NOTE — PHYSICAL EXAM
[General Appearance - Alert] : alert [General Appearance - In No Acute Distress] : in no acute distress [Affect] : the affect was normal [FreeTextEntry1] : patient has worsened dementia

## 2020-04-20 NOTE — DATA REVIEWED
[FreeTextEntry1] : CT C/A/P (3/3/20) - IMPRESSION: \par Resolution of soft tissue density in the bronchus intermedius with reexpansion of the right lower lobe with residual peripheral consolidation which may represent infection and/or tumor burden. Findings consistent with lymphangitic spread in the right lower lobe. Additional scattered nodules within the right lung are without significant change. \par \par Mediastinal lymphadenopathy is increased since prior exam 11/10/2019 with infiltration of the tumor into the superior vena cava. \par \par Stable hypoattenuating splenic lesions and adrenal nodules. \par \par Unchanged appendiceal dilatation and nodular thickening compared to prior exam 11/10/2019.

## 2020-04-20 NOTE — ASSESSMENT
[Completed Medical Orders for Life-Sustaining Treatment] : completed medical orders for life-sustaining treatment [DNR] : Code Status: DNR [Limited] : Treatment Guidelines: Limited [DNI] : Intubation: DNI [______] : HCP: [unfilled] [FreeTextEntry1] : 74yoF with:\par \par 1. Small Cell lung cancer - Keytruda on hold. Follow up with Med Onc.\par \par 2. Diarrhea - Patient has an upcoming visit with GI on 4/20 for a full evaluation of her chronic diarrhea.  Will defer further work up and treatment of the diarrhea to GI.\par \par Did discuss with patient and daughters the various causes of diarrhea and that patient might benefit from doing trials of elimination of certain foods from her diet such as lactose-containing and gluten-containing foods.  Can c/w Imodium PRN at this time. \par \par 3. STRICKLAND - Patient has active SCLC and COPD, and additionally smokes heavily.  \par \par 4. Poor appetite - Patient did not care to start mirtazapine as previously prescribed. C/w small frequent meals, recommend diversification of diet with more nutritious foods.  \par \par 5. Encounter for Palliative Care - HCP and MOLST forms on file. DNR/DNI.  \par \par Follow up call in 1 week

## 2020-04-20 NOTE — CONSULT LETTER
[FreeTextEntry1] : Dear Dr. Verona Doss,\[facility] \[facility] I had the pleasure of seeing your patient ROSA RAMOS in the office today.  My office note is attached. PLEASE READ THE "ASSESSMENT" SECTION OF THE NOTE TO SEE MY IMPRESSION AND PLAN.\par \[facility] Thank you very much for allowing me to participate in the care of your patient.\par \[facility] Sincerely,\[facility] \[facility] Alfa Ferrera M.D., FAC, [facility] Director, Celiac Program at [facility]  of Medicine\[facility] [employer] at [facility] Practice Director,[facility] - Gastroenterology/Internal Medicine at [place]\[facility] [address]\[place] [address]\[facility] Tel: [phone]\[facility] Email: [email or web address] \[facility] \[facility] The attached note has been created using a voice recognition system (Dragon).  There may be some misspellings and typos.  Please call my office if you have any issues or questions.

## 2020-04-20 NOTE — ASSESSMENT
[FreeTextEntry1] : Patient with small cell lung cancer and alcoholic cirrhosis.  She has had several months of diarrhea that may be worsening.  Blood work done 3 days ago shows leukocytosis with WBC equals 16.29 and elevated liver tests including an alkaline phosphatase of 232.  I am concerned regarding an active infection either in the GI tract or elsewhere.  She is being checked for UTI.  Pneumonia should also be considered.  CT scan shows appendiceal dilatation and thickening of unclear etiology.  This could be a nidus of infection but also could represent an appendiceal tumor.  Carcinoid would be a consideration.\par \par At present, the most important thing is to rule out an active GI infection.  Stool tests will be sent for infectious PCR testing (which is more inclusive and accurate then C&S and O&P) as well as C. difficile PCR and calprotectin.\par \par Given the high white blood cell count and desire to keep the patient out of the hospital emergency room, the patient was sent for another CT scan of the abdomen and pelvis to look for colitis, evidence of biliary tract disease, and to reevaluate the appendix.\par \par I advised the patient's daughters that COVID-19 can present with diarrhea although this is less likely given the more chronic nature of the diarrhea.\par \par I advised that they can use Imodium 1 tablet a day to keep the diarrhea and check at the present time.\par \par The daughters were advised to take the patient to the emergency room immediately if her symptoms worsen or she develops significant fever.\par \par Otherwise, I will see the patient again via a tele-visit in 2 weeks.

## 2020-04-20 NOTE — PHYSICAL EXAM
[General Appearance - Alert] : alert [Bowel Sounds] : normal bowel sounds [Abdomen Soft] : soft [Affect] : the affect was normal [FreeTextEntry1] : thin, sitting in wheelchair, [place], cachectic

## 2020-04-20 NOTE — REASON FOR VISIT
[Initial Evaluation] : an initial evaluation [Other: _____] : [unfilled] [FreeTextEntry1] : Diarrhea, lower abdominal pain, leukocytosis, elevated liver tests

## 2020-04-26 PROBLEM — C34.90 LUNG CANCER: Status: ACTIVE | Noted: 2019-04-08

## 2020-04-30 PROBLEM — R06.09 DYSPNEA ON EXERTION: Status: ACTIVE | Noted: 2020-01-01

## 2020-04-30 PROBLEM — C34.90 SMALL CELL LUNG CANCER: Status: ACTIVE | Noted: 2019-01-01

## 2020-04-30 PROBLEM — R53.81 PHYSICAL DEBILITY: Status: ACTIVE | Noted: 2020-01-01

## 2020-04-30 PROBLEM — Z51.5 ENCOUNTER FOR PALLIATIVE CARE: Status: ACTIVE | Noted: 2019-01-01

## 2020-04-30 NOTE — PHYSICAL EXAM
[General Appearance - Alert] : alert [Affect] : the affect was normal [FreeTextEntry1] : thin, sitting in wheelchair, [place], cachectic

## 2020-04-30 NOTE — HISTORY OF PRESENT ILLNESS
[Home] : at home, [unfilled] , at the time of the visit. [Medical Office: ([facility])___] : at the medical office located in  [Spouse] : spouse [Other:____] : [unfilled] [Patient] : the patient [Self] : self [FreeTextEntry1] : 75yoF with SCLC presents for follow up visit via TeleMedicine. \par PMH significant for COPD with baseline sat of 91%, >50 pack year smoking history, ETOH misuse (2-3 glasses of wine per day), [place] ([place]   Patient currently smokes 2 packs per day (lifelong).  \par \par Last treatment with Keytruda was 1/29/20. \par \par Lately a big issue for patient has been diarrhea that began around Nov/December 2019. She seems to have bouts with it about every other day, 1-2 episodes of water diarrhea on those days. Stool studies have not been revealing of an infectious cause to date.  Has not had any changes in her dietary habits lately.  Has fecal incontinence on occasion when she cannot get to bathroom in time, wears a diaper. The diarrhea is associated with abdominal cramping that resolves after the BM.  \par \par Interval Hx:  Since our meeting one week ago patient met with GI, Dr. Ferrera. She is to undergo some further stool studies and a CT A/P was ordered.  Patient's daughter Aura called me two days ago to report that since patient had such a significant clinical decline over the past two weeks and she is very debilitated the family would like for home hospice services to be initiated.  A referral was made to [facility] and a nurse is coming today for an admissions visit. \par \par Patient is about to start a course of Cipro for UTI, daughter picked up Rx this morning. \par \par Two days ago she had a pretty bad day with the diarrhea and then yesterday she was doing pretty well.  These are the types of fluctuations she's been having. \par \par Eats the same diet as her usual, which is very poor not diverse.  Eats small amounts of food throughout the day.\par She drinks 7-10 cups of caffeinated tea daily, fruit juices, very little if any water. \par \par ROS:\par +cognitive impairment (per daughters)\par +weakness - completed PT last month without significant improvement. \par +fatigue \par +[place] - is limited in how much she can walk due to becoming winded.\par +appetite is low but she'll eat if food is presented to her. Had previously been started on mirtazapine, did not use it for long. Takes a mult-vitamin and folic acid daily\par +weight loss of 9 lb in 6 months\par Denies trouble sleeping, n/v\par All other ROS as outlined or noncontributory. \par \par --\par This visit was completed via TeleHealth due to the restrictions of the COVID-19 pandemic. All issues as documented were discussed and addressed but no physical exam was performed unless allowed by visual confirmation via TeleHealth.

## 2020-04-30 NOTE — ASSESSMENT
[Completed Medical Orders for Life-Sustaining Treatment] : completed medical orders for life-sustaining treatment [DNR] : Code Status: DNR [Limited] : Treatment Guidelines: Limited [DNI] : Intubation: DNI [______] : HCP: [unfilled] [FreeTextEntry1] : 74yoF with:\par \par 1. Small Cell lung cancer - Previously on Keytruda with no plans to restart given patient's clinical decline. \par \par 2. Diarrhea - Appreciate GI consult from 4/20/20.  Pt's daughters are unlikely to be able to bring patient for the ordered CT. They will submit stool in for the ordered studies and use Imodium PRN. \par \par 3. [place] - Patient has active SCLC and COPD, and additionally smokes heavily.  \par \par 4. Physical debility - Patient requiring progressively more help with ADLs. \par \par 5. Encounter for Palliative Care - HCP and MOLST forms on file. DNR/DNI.  \par [facility] services to be initiated today. \par \par Follow up as needed, call with questions or issues.

## 2020-05-04 PROBLEM — D72.829 LEUKOCYTOSIS, UNSPECIFIED TYPE: Status: ACTIVE | Noted: 2020-01-01

## 2020-05-04 PROBLEM — R19.7 DIARRHEA: Status: ACTIVE | Noted: 2020-01-01

## 2020-05-04 NOTE — PHYSICAL EXAM
[General Appearance - In No Acute Distress] : in no acute distress [Affect] : the affect was normal [FreeTextEntry1] : patient has worsened dementia

## 2020-05-04 NOTE — HISTORY OF PRESENT ILLNESS
[Other Location: e.g. Home (Enter Location, City,State)___] : at [unfilled] [Home] : at home, [unfilled] , at the time of the visit. [Other:____] : [unfilled] [Patient] : the patient [FreeTextEntry2] : and daughters [FreeTextEntry1] : The patient is lethargic and has dementia and therefore the history was provided by the patient's daughter.  The patient has small cell lung cancer and alcoholic cirrhosis.  Stool test done since our last visit were negative for infection but did have an elevated calprotectin of 133.  The patient's last white blood cell count was 16.29 on April 17, 2020.  Since then, the patient was diagnosed with a urinary tract infection and treated with 1 week of Cipro.  The patient stopped falling after this treatment but remains lethargic.  The patient has been on hospice and has been given Haldol and lorazepam which is made her more confused.  The patient is receiving Imodium and is moving her bowels less frequently.  She has a bowel movement every 3 days which is now soft and no longer watery.  They deny fever, abdominal pain, melena, bright red blood per rectum.  The family believes that the patient is not strong enough to undergo CT scan and they have decided not to do this at the present time.\par \par \par Note from 4/20/2020 - The patient is a 75-year-old woman with small cell lung cancer and alcoholic cirrhosis.  She suffers from deafness and dementia.  The patient's 2 daughters were present with her and provided much of the history.  The patient has been treated for the lung cancer with Keytruda from July to the end of January.  The treatment has been poor was due to the coronavirus pandemic.  Since Thanksgiving, the patient has had diarrhea.  She currently has about 3 loose bowel movements a day.  It is unclear whether the diarrhea was as bad at first as she was saying it occurred only every 2 to 3 days.  The patient has been on Lomotil once a day over the last few days and did not have a bowel movement yesterday and had only one today.  The patient complains of urgency and lower abdominal pain/cramps.  She denies melena or bright red blood per rectum.  Yesterday, the patient had a temperature of 99.3 degrees and today the temperature is 99 degrees.  The patient has lost 14 pounds over the past year.  The patient's baseline dementia has worsened recently.\par \par The patient had a CT scan on March 3, 2020 which showed appendiceal dilatation and nodular thickening up to 1.2 cm which was unchanged from a CT scan done on November 10, 2019.  There was no evidence of colitis.  Stool tests including CNS, O&P, and C. difficile PCR were negative on March 30, 2020.  Blood work done on April 17, 2020 was significant for leukocytosis with a white blood cell count of 16.29 along with AST of 59 ALT of 54 and alkaline phosphatase of 232.  Of note, these labs were normal in February.  Patient has a history of alcoholism and continues to drink although now drinks less, about 2 to 3 glasses of wine a night.  The patient continues to smoke cigarettes.\par \par  The patient has not been admitted to the hospital in the past year and denies any cardiac issues.  The patient has never had a colonoscopy.

## 2020-05-04 NOTE — CONSULT LETTER
[FreeTextEntry1] : Dear Dr. Verona Doss,\[facility] \[facility] I had the pleasure of seeing your patient ROSA RAMOS in the office today.  My office note is attached. PLEASE READ THE "ASSESSMENT" SECTION OF THE NOTE TO SEE MY IMPRESSION AND PLAN.\par \[facility] Thank you very much for allowing me to participate in the care of your patient.\par \[facility] Sincerely,\[facility] \[facility] Alfa Ferrera M.D., FAC, [facility] Director, Celiac Program at [facility]  of Medicine\[facility] [employer] [facility] Practice Director,[facility] - Gastroenterology/Internal Medicine at [place]\[facility] [address]\[place] [address]\[facility] Tel: [phone]\[facility] Email: [email or web address] \[facility] \[facility] The attached note has been created using a voice recognition system (Dragon).  There may be some misspellings and typos.  Please call my office if you have any issues or questions.

## 2020-05-04 NOTE — ASSESSMENT
[FreeTextEntry1] : Patient with small cell lung cancer and alcoholic cirrhosis now on hospice.  She had a markedly elevated white blood cell count but this was prior to being treated for UTI.  Stool studies were negative for infection but did show an elevated calprotectin suggestive of inflammation in the GI tract.  The family does not believe that the patient is strong enough to undergo CT scan.\par \par We agreed to have repeat blood drawn.  This will be sent for CBC, CHEM pack, sed rate, C-reactive protein, GGTP.\par \par We will hold off on CT scan but will consider it more strongly if the white blood cell count remains elevated.\par \par I advised Imodium only if she has 2 or more loose stools in a day.\par \par \par Plan from 4/20/2020 - Patient with small cell lung cancer and alcoholic cirrhosis.  She has had several months of diarrhea that may be worsening.  Blood work done 3 days ago shows leukocytosis with WBC equals 16.29 and elevated liver tests including an alkaline phosphatase of 232.  I am concerned regarding an active infection either in the GI tract or elsewhere.  She is being checked for UTI.  Pneumonia should also be considered.  CT scan shows appendiceal dilatation and thickening of unclear etiology.  This could be a nidus of infection but also could represent an appendiceal tumor.  Carcinoid would be a consideration.\par \par At present, the most important thing is to rule out an active GI infection.  Stool tests will be sent for infectious PCR testing (which is more inclusive and accurate then C&S and O&P) as well as C. difficile PCR and calprotectin.\par \par Given the high white blood cell count and desire to keep the patient out of the hospital emergency room, the patient was sent for another CT scan of the abdomen and pelvis to look for colitis, evidence of biliary tract disease, and to reevaluate the appendix.\par \par I advised the patient's daughters that COVID-19 can present with diarrhea although this is less likely given the more chronic nature of the diarrhea.\par \par I advised that they can use Imodium 1 tablet a day to keep the diarrhea and check at the present time.\par \par The daughters were advised to take the patient to the emergency room immediately if her symptoms worsen or she develops significant fever.\par \par Otherwise, I will see the patient again via a tele-visit in 2 weeks.

## 2020-12-23 PROBLEM — N39.0 URINARY TRACT INFECTION, ACUTE: Status: RESOLVED | Noted: 2020-01-01 | Resolved: 2020-12-23
